# Patient Record
Sex: FEMALE | Race: OTHER | Employment: FULL TIME | URBAN - METROPOLITAN AREA
[De-identification: names, ages, dates, MRNs, and addresses within clinical notes are randomized per-mention and may not be internally consistent; named-entity substitution may affect disease eponyms.]

---

## 2019-01-28 ENCOUNTER — OFFICE VISIT (OUTPATIENT)
Dept: FAMILY MEDICINE CLINIC | Age: 24
End: 2019-01-28

## 2019-01-28 VITALS
TEMPERATURE: 98.6 F | BODY MASS INDEX: 26.66 KG/M2 | OXYGEN SATURATION: 99 % | DIASTOLIC BLOOD PRESSURE: 83 MMHG | SYSTOLIC BLOOD PRESSURE: 127 MMHG | WEIGHT: 127 LBS | HEART RATE: 92 BPM | RESPIRATION RATE: 16 BRPM | HEIGHT: 58 IN

## 2019-01-28 DIAGNOSIS — G56.03 BILATERAL CARPAL TUNNEL SYNDROME: ICD-10-CM

## 2019-01-28 DIAGNOSIS — R10.13 INTERMITTENT EPIGASTRIC ABDOMINAL PAIN: ICD-10-CM

## 2019-01-28 DIAGNOSIS — M62.838 CERVICAL PARASPINAL MUSCLE SPASM: Primary | ICD-10-CM

## 2019-01-28 RX ORDER — BACLOFEN 10 MG/1
10 TABLET ORAL
Qty: 30 TAB | Refills: 1 | Status: SHIPPED | OUTPATIENT
Start: 2019-01-28 | End: 2019-06-20

## 2019-01-28 RX ORDER — DICLOFENAC SODIUM 75 MG/1
75 TABLET, DELAYED RELEASE ORAL
Qty: 30 TAB | Refills: 1 | Status: SHIPPED | OUTPATIENT
Start: 2019-01-28 | End: 2020-08-25

## 2019-01-28 RX ORDER — OMEPRAZOLE 20 MG/1
20 CAPSULE, DELAYED RELEASE ORAL DAILY
Qty: 30 CAP | Refills: 0 | Status: SHIPPED | OUTPATIENT
Start: 2019-01-28 | End: 2019-03-23 | Stop reason: SDUPTHER

## 2019-01-28 NOTE — PROGRESS NOTES
Chief Complaint   Patient presents with    Abdominal Pain     x1 month    Back Pain     x1 month     she is a 24y.o. year old female who presents for evalution. Pt states has been having upper pain in back x months. Pt states has been having pain every day. Has not tried any OTCs or tried heat or ice. Pt works sitting down most of day, always at computer. No radiation into arms. Intermittent epigastric pains, only every once in awhile. Sometimes worse than others. Present for past 2 months. Eating twice daily. Usually if eats a lot before bed when wakes up in AM will have epigastric pain. No changes in bowel movements. Also has some tingling in hands that happens intermittently. Worse when on computer all day. Has not tried any OTCs. Reviewed PmHx, RxHx, FmHx, SocHx, AllgHx and updated and dated in the chart. Review of Systems - negative except as listed above in the HPI    Objective:     Vitals:    01/28/19 1353   BP: 127/83   Pulse: 92   Resp: 16   Temp: 98.6 °F (37 °C)   TempSrc: Oral   SpO2: 99%   Weight: 127 lb (57.6 kg)   Height: 4' 10\" (1.473 m)     Physical Examination: General appearance - alert, well appearing, and in no distress  Chest - clear to auscultation, no wheezes, rales or rhonchi, symmetric air entry  Heart - normal rate, regular rhythm, normal S1, S2, no murmurs, rubs, clicks or gallops  Abdomen - soft, nontender, nondistended, no masses or organomegaly  bowel sounds normal  Back exam - limited range of motion, pain with motion noted during exam, tenderness noted cervical spine, bilateral trap tightness     Assessment/ Plan:   Diagnoses and all orders for this visit:    1. Cervical paraspinal muscle spasm  -     baclofen (LIORESAL) 10 mg tablet; Take 1 Tab by mouth nightly as needed. For muscle spasms  New rx. Also printed out stretches. Application of heat or ice. F/U prn    2.  Intermittent epigastric abdominal pain  -     omeprazole (PRILOSEC) 20 mg capsule; Take 1 Cap by mouth daily. Take in AM on empty stomach, wait 30-60 min before eating  New rx. GERD diet, advance as tolerated, recommended adding probiotics to diet. 3. Bilateral carpal tunnel syndrome  -     diclofenac EC (VOLTAREN) 75 mg EC tablet; Take 1 Tab by mouth two (2) times daily (after meals). New rx for use after Prilosec. Stretches also given. FU prn     Pt voiced understanding regarding plan of care. Follow-up Disposition:  Return if symptoms worsen or fail to improve. I have discussed the diagnosis with the patient and the intended plan as seen in the above orders. The patient has received an after-visit summary and questions were answered concerning future plans.      Medication Side Effects and Warnings were discussed with patient    Dennis Montano NP

## 2019-01-28 NOTE — PROGRESS NOTES
1. Have you been to the ER, urgent care clinic since your last visit? Hospitalized since your last visit? No    2. Have you seen or consulted any other health care providers outside of the [address] [place] [address] since your last visit? Include any pap smears or colon screening.  No   Chief Complaint   Patient presents with    Abdominal Pain     x1 month    Back Pain     x1 month

## 2019-01-28 NOTE — PATIENT INSTRUCTIONS
For stomach - work on eating regularly throughout day time, usually no longer than 4-6 hours without food, start yogurt with probiotics  Take medication (omeprazole) for 2 weeks and then stop     For back - first try Baclofen at bedtime and exercises, is pain still present after 2 weeks on omeprazole may start taking Diclofenac - MUST TAKE WITH FOOD, can cause ulcer in stomach if taken on empty stomach      Carpal Tunnel Syndrome: Exercises  Your Care Instructions  Here are some examples of typical rehabilitation exercises for your condition. Start each exercise slowly. Ease off the exercise if you start to have pain. Your doctor or your physical or occupational therapist will tell you when you can start these exercises and which ones will work best for you. Warm-up stretches  When you no longer have pain or numbness, you can do exercises to help prevent carpal tunnel syndrome from coming back. Do not do any stretch or movement that is uncomfortable or painful. 1. Rotate your wrist up, down, and from side to side. Repeat 4 times. 2. Stretch your fingers far apart. Relax them, and then stretch them again. Repeat 4 times. 3. Stretch your thumb by pulling it back gently, holding it, and then releasing it. Repeat 4 times. How to do the exercises  Prayer stretch    1. Start with your palms together in front of your chest just below your chin. 2. Slowly lower your hands toward your waistline, keeping your hands close to your stomach and your palms together until you feel a mild to moderate stretch under your forearms. 3. Hold for at least 15 to 30 seconds. Repeat 2 to 4 times. Wrist flexor stretch    1. Extend your arm in front of you with your palm up. 2. Bend your wrist, pointing your hand toward the floor. 3. With your other hand, gently bend your wrist farther until you feel a mild to moderate stretch in your forearm. 4. Hold for at least 15 to 30 seconds. Repeat 2 to 4 times.     Wrist extensor stretch    1. Repeat steps 1 through 4 of the stretch above, but begin with your extended hand palm down. Follow-up care is a key part of your treatment and safety. Be sure to make and go to all appointments, and call your doctor if you are having problems. It's also a good idea to know your test results and keep a list of the medicines you take. Where can you learn more? Go to http://birdie-marisel.info/. Enter J347 in the search box to learn more about \"Carpal Tunnel Syndrome: Exercises. \"  Current as of: September 20, 2018  Content Version: 11.9  © 9829-7206 [employer]. Care instructions adapted under license by [employer] (which disclaims liability or warranty for this information). If you have questions about a medical condition or this instruction, always ask your healthcare professional. [address] any warranty or liability for your use of this information. Neck Spasm: Exercises  Your Care Instructions  Here are some examples of typical rehabilitation exercises for your condition. Start each exercise slowly. Ease off the exercise if you start to have pain. Your doctor or physical therapist will tell you when you can start these exercises and which ones will work best for you. How to do the exercises  Levator scapula stretch    1. Sit in a firm chair, or stand up straight. 2. Gently tilt your head toward your left shoulder. 3. Turn your head to look down into your armpit, bending your head slightly forward. Let the weight of your head stretch your neck muscles. 4. Hold for 15 to 30 seconds. 5. Return to your starting position. 6. Follow the same instructions above, but tilt your head toward your right shoulder. 7. Repeat 2 to 4 times toward each shoulder. Upper trapezius stretch    1. Sit in a firm chair, or stand up straight. 2. This stretch works best if you keep your shoulder down as you lean away from it.  To help you remember to do this, start by relaxing your shoulders and lightly holding on to your thighs or your chair. 3. Tilt your head toward your shoulder and hold for 15 to 30 seconds. Let the weight of your head stretch your muscles. 4. If you would like a little added stretch, place your arm behind your back. Use the arm opposite of the direction you are tilting your head. For example, if you are tilting your head to the left, place your right arm behind your back. 5. Repeat 2 to 4 times toward each shoulder. Neck rotation    1. Sit in a firm chair, or stand up straight. 2. Keeping your chin level, turn your head to the right, and hold for 15 to 30 seconds. 3. Turn your head to the left, and hold for 15 to 30 seconds. 4. Repeat 2 to 4 times to each side. Chin tuck    1. Lie on the floor with a rolled-up towel under your neck. Your head should be touching the floor. 2. Slowly bring your chin toward the front of your neck. 3. Hold for a count of 6, and then relax for up to 10 seconds. 4. Repeat 8 to 12 times. Forward neck flexion    1. Sit in a firm chair, or stand up straight. 2. Bend your head forward. 3. Hold for 15 to 30 seconds, then return to your starting position. 4. Repeat 2 to 4 times. Follow-up care is a key part of your treatment and safety. Be sure to make and go to all appointments, and call your doctor if you are having problems. It's also a good idea to know your test results and keep a list of the medicines you take. Where can you learn more? Go to http://birdie-marisel.info/. Enter P962 in the search box to learn more about \"Neck Spasm: Exercises. \"  Current as of: September 20, 2018  Content Version: 11.9  © 4503-3398 [employer]. Care instructions adapted under license by [employer] (which disclaims liability or warranty for this information).  If you have questions about a medical condition or this instruction, always ask your healthcare professional. [address] any warranty or liability for your use of this information.

## 2019-03-23 DIAGNOSIS — R10.13 INTERMITTENT EPIGASTRIC ABDOMINAL PAIN: ICD-10-CM

## 2019-03-26 RX ORDER — OMEPRAZOLE 20 MG/1
CAPSULE, DELAYED RELEASE ORAL
Qty: 30 CAP | Refills: 0 | Status: SHIPPED | OUTPATIENT
Start: 2019-03-26 | End: 2019-06-02 | Stop reason: SDUPTHER

## 2019-06-20 ENCOUNTER — OFFICE VISIT (OUTPATIENT)
Dept: FAMILY MEDICINE CLINIC | Age: 24
End: 2019-06-20

## 2019-06-20 VITALS
DIASTOLIC BLOOD PRESSURE: 63 MMHG | HEART RATE: 76 BPM | OXYGEN SATURATION: 99 % | WEIGHT: 125.3 LBS | TEMPERATURE: 98.7 F | HEIGHT: 58 IN | BODY MASS INDEX: 26.3 KG/M2 | SYSTOLIC BLOOD PRESSURE: 100 MMHG | RESPIRATION RATE: 15 BRPM

## 2019-06-20 DIAGNOSIS — R53.83 FATIGUE, UNSPECIFIED TYPE: Primary | ICD-10-CM

## 2019-06-20 DIAGNOSIS — G56.03 BILATERAL CARPAL TUNNEL SYNDROME: ICD-10-CM

## 2019-06-20 NOTE — PROGRESS NOTES
Chief Complaint   Patient presents with    Fatigue     X 2 weeks Feels cold: not taking Iron    Tingling     Hands and feet- Numbness    Nausea     GERD/abdo pain    Abdominal Pain     Back pain     1. Have you been to the ER, urgent care clinic since your last visit? Hospitalized since your last visit? No    2. Have you seen or consulted any other health care providers outside of the [address] [place] [address] since your last visit? Include any pap smears or colon screening. No      Chief Complaint   Patient presents with    Fatigue     X 2 weeks Feels cold: not taking Iron    Tingling     Hands and feet- Numbness    Nausea     GERD/abdo pain    Abdominal Pain     Back pain     She is a 21 y.o. female who presents for evalution. Reviewed PmHx, RxHx, FmHx, SocHx, AllgHx and updated and dated in the chart. Patient Active Problem List    Diagnosis    Pregnancy       Review of Systems - negative except as listed above in the HPI    Objective:     Vitals:    06/20/19 1548   BP: 100/63   Pulse: 76   Resp: 15   Temp: 98.7 °F (37.1 °C)   TempSrc: Oral   SpO2: 99%   Weight: 125 lb 4.8 oz (56.8 kg)   Height: 4' 10\" (1.473 m)     Physical Examination: General appearance - alert, well appearing, and in no distress  Chest - clear to auscultation, no wheezes, rales or rhonchi, symmetric air entry  Heart - normal rate, regular rhythm, normal S1, S2, no murmurs, rubs, clicks or gallops  Abdomen - soft, nontender, nondistended, no masses or organomegaly  Musculoskeletal - no joint tenderness, deformity or swelling    Assessment/ Plan:   Diagnoses and all orders for this visit:    1. Fatigue, unspecified type  -     METABOLIC PANEL, COMPREHENSIVE  -     CBC WITH AUTOMATED DIFF  -     TSH 3RD GENERATION  -     LIANET BARR VIRUS AB PANEL  -no clear source by hx    2.  Bilateral carpal tunnel syndrome  -rec CTS braces nightlyh           I have discussed the diagnosis with the patient and the intended plan as seen in the above orders. The patient understands and agrees with the plan. The patient has received an after-visit summary and questions were answered concerning future plans. Medication Side Effects and Warnings were discussed with patient  Patient Labs were reviewed and or requested:  Patient Past Records were reviewed and or requested    Debra Rae M.D. Patient Instructions          Carpal Tunnel Syndrome: Care Instructions  Your Care Instructions    Carpal tunnel syndrome is a nerve problem. It can cause tingling, numbness, weakness, or pain in the fingers, thumb, and hand. The median nerve and several tough tissues called tendons run through a space in the wrist called the carpal tunnel. The repeated hand motions used in work and some hobbies and sports can put pressure on the nerve. Pregnancy and several conditions, including diabetes, arthritis, and an underactive thyroid, also can cause carpal tunnel syndrome. You may be able to limit an activity or do it differently to reduce your symptoms. You also can take other steps to feel better. If your symptoms are mild, 1 to 2 weeks of home treatment are likely to ease your pain. Surgery is needed only if other treatments do not work. Follow-up care is a key part of your treatment and safety. Be sure to make and go to all appointments, and call your doctor if you are having problems. It's also a good idea to know your test results and keep a list of the medicines you take. How can you care for yourself at home? · If possible, stop or reduce the activity that causes your symptoms. If you cannot stop the activity, take frequent breaks to rest and stretch or change hand positions to do a task. Try switching hands, such as when using a computer mouse. · Try to avoid bending or twisting your wrists. · Ask your doctor if you can take an over-the-counter pain medicine, such as acetaminophen (Tylenol), ibuprofen (Advil, Motrin), or naproxen (Aleve).  Be safe with medicines. Read and follow all instructions on the label. · If your doctor prescribes corticosteroid medicine to help reduce pain and swelling, take it exactly as prescribed. Call your doctor if you think you are having a problem with your medicine. · Put ice or a cold pack on your wrist for 10 to 20 minutes at a time to ease pain. Put a thin cloth between the ice and your skin. · If your doctor or your physical or occupational therapist tells you to wear a wrist splint, wear it as directed to keep your wrist in a neutral position. This also eases pressure on your median nerve. · Ask your doctor whether you should have physical or occupational therapy to learn how to do tasks differently. · Try a yoga class to stretch your muscles and build strength in your hands and wrists. Yoga has been shown to ease carpal tunnel symptoms. To prevent carpal tunnel  · When working at a computer, keep your hands and wrists in line with your forearms. Hold your elbows close to your sides. Take a break every 10 to 15 minutes. · Try these exercises:  ? Warm up: Rotate your wrist up, down, and from side to side. Repeat this 4 times. Stretch your fingers far apart, relax them, then stretch them again. Repeat 4 times. Stretch your thumb by pulling it back gently, holding it, and then releasing it. Repeat 4 times. ? Prayer stretch: Start with your palms together in front of your chest just below your chin. Slowly lower your hands toward your waistline while keeping your hands close to your stomach and your palms together until you feel a mild to moderate stretch under your forearms. Hold for 10 to 20 seconds. Repeat 4 times. ? Wrist flexor stretch: Hold your arm in front of you with your palm up. Bend your wrist, pointing your hand toward the floor. With your other hand, gently bend your wrist further until you feel a mild to moderate stretch in your forearm. Hold for 10 to 20 seconds. Repeat 4 times.   ? Wrist extensor stretch: Repeat the steps for the wrist flexor stretch, but begin with your extended hand palm down. · Squeeze a rubber exercise ball several times a day to keep your hands and fingers strong. · Avoid holding objects (such as a book) in one position for a long time. When possible, use your whole hand to grasp an object. Using just the thumb and index finger can put stress on the wrist.  · Do not smoke. It can make this condition worse by reducing blood flow to the median nerve. If you need help quitting, talk to your doctor about stop-smoking programs and medicines. These can increase your chances of quitting for good. When should you call for help? Watch closely for changes in your health, and be sure to contact your doctor if:    · Your pain or other problems do not get better with home care.     · You want more information about physical or occupational therapy.     · You have side effects of your corticosteroid medicine, such as:  ? Weight gain. ? Mood changes. ? Trouble sleeping. ? Bruising easily.     · You have any other problems with your medicine. Where can you learn more? Go to http://birdie-marisel.info/. Enter R432 in the search box to learn more about \"Carpal Tunnel Syndrome: Care Instructions. \"  Current as of: September 20, 2018  Content Version: 11.9  © 2253-4640 [employer]. Care instructions adapted under license by [employer] (which disclaims liability or warranty for this information). If you have questions about a medical condition or this instruction, always ask your healthcare professional. [place] [address] any warranty or liability for your use of this information.

## 2019-06-20 NOTE — PATIENT INSTRUCTIONS
Carpal Tunnel Syndrome: Care Instructions  Your Care Instructions    Carpal tunnel syndrome is a nerve problem. It can cause tingling, numbness, weakness, or pain in the fingers, thumb, and hand. The median nerve and several tough tissues called tendons run through a space in the wrist called the carpal tunnel. The repeated hand motions used in work and some hobbies and sports can put pressure on the nerve. Pregnancy and several conditions, including diabetes, arthritis, and an underactive thyroid, also can cause carpal tunnel syndrome. You may be able to limit an activity or do it differently to reduce your symptoms. You also can take other steps to feel better. If your symptoms are mild, 1 to 2 weeks of home treatment are likely to ease your pain. Surgery is needed only if other treatments do not work. Follow-up care is a key part of your treatment and safety. Be sure to make and go to all appointments, and call your doctor if you are having problems. It's also a good idea to know your test results and keep a list of the medicines you take. How can you care for yourself at home? · If possible, stop or reduce the activity that causes your symptoms. If you cannot stop the activity, take frequent breaks to rest and stretch or change hand positions to do a task. Try switching hands, such as when using a computer mouse. · Try to avoid bending or twisting your wrists. · Ask your doctor if you can take an over-the-counter pain medicine, such as acetaminophen (Tylenol), ibuprofen (Advil, Motrin), or naproxen (Aleve). Be safe with medicines. Read and follow all instructions on the label. · If your doctor prescribes corticosteroid medicine to help reduce pain and swelling, take it exactly as prescribed. Call your doctor if you think you are having a problem with your medicine. · Put ice or a cold pack on your wrist for 10 to 20 minutes at a time to ease pain.  Put a thin cloth between the ice and your skin.  · If your doctor or your physical or occupational therapist tells you to wear a wrist splint, wear it as directed to keep your wrist in a neutral position. This also eases pressure on your median nerve. · Ask your doctor whether you should have physical or occupational therapy to learn how to do tasks differently. · Try a yoga class to stretch your muscles and build strength in your hands and wrists. Yoga has been shown to ease carpal tunnel symptoms. To prevent carpal tunnel  · When working at a computer, keep your hands and wrists in line with your forearms. Hold your elbows close to your sides. Take a break every 10 to 15 minutes. · Try these exercises:  ? Warm up: Rotate your wrist up, down, and from side to side. Repeat this 4 times. Stretch your fingers far apart, relax them, then stretch them again. Repeat 4 times. Stretch your thumb by pulling it back gently, holding it, and then releasing it. Repeat 4 times. ? Prayer stretch: Start with your palms together in front of your chest just below your chin. Slowly lower your hands toward your waistline while keeping your hands close to your stomach and your palms together until you feel a mild to moderate stretch under your forearms. Hold for 10 to 20 seconds. Repeat 4 times. ? Wrist flexor stretch: Hold your arm in front of you with your palm up. Bend your wrist, pointing your hand toward the floor. With your other hand, gently bend your wrist further until you feel a mild to moderate stretch in your forearm. Hold for 10 to 20 seconds. Repeat 4 times. ? Wrist extensor stretch: Repeat the steps for the wrist flexor stretch, but begin with your extended hand palm down. · Squeeze a rubber exercise ball several times a day to keep your hands and fingers strong. · Avoid holding objects (such as a book) in one position for a long time. When possible, use your whole hand to grasp an object.  Using just the thumb and index finger can put stress on the wrist.  · Do not smoke. It can make this condition worse by reducing blood flow to the median nerve. If you need help quitting, talk to your doctor about stop-smoking programs and medicines. These can increase your chances of quitting for good. When should you call for help? Watch closely for changes in your health, and be sure to contact your doctor if:    · Your pain or other problems do not get better with home care.     · You want more information about physical or occupational therapy.     · You have side effects of your corticosteroid medicine, such as:  ? Weight gain. ? Mood changes. ? Trouble sleeping. ? Bruising easily.     · You have any other problems with your medicine. Where can you learn more? Go to http://birdie-marisel.info/. Enter R432 in the search box to learn more about \"Carpal Tunnel Syndrome: Care Instructions. \"  Current as of: September 20, 2018  Content Version: 11.9  © 6668-4121 [employer]. Care instructions adapted under license by [employer] (which disclaims liability or warranty for this information). If you have questions about a medical condition or this instruction, always ask your healthcare professional. [place] [address] any warranty or liability for your use of this information.

## 2019-06-22 LAB
ALBUMIN SERPL-MCNC: 5.1 G/DL (ref 3.5–5.5)
ALBUMIN/GLOB SERPL: 1.8 {RATIO} (ref 1.2–2.2)
ALP SERPL-CCNC: 91 IU/L (ref 39–117)
ALT SERPL-CCNC: 13 IU/L (ref 0–32)
AST SERPL-CCNC: 21 IU/L (ref 0–40)
BASOPHILS # BLD AUTO: 0 X10E3/UL (ref 0–0.2)
BASOPHILS NFR BLD AUTO: 0 %
BILIRUB SERPL-MCNC: 1 MG/DL (ref 0–1.2)
BUN SERPL-MCNC: 7 MG/DL (ref 6–20)
BUN/CREAT SERPL: 9 (ref 9–23)
CALCIUM SERPL-MCNC: 9.6 MG/DL (ref 8.7–10.2)
CHLORIDE SERPL-SCNC: 104 MMOL/L (ref 96–106)
CO2 SERPL-SCNC: 23 MMOL/L (ref 20–29)
CREAT SERPL-MCNC: 0.81 MG/DL (ref 0.57–1)
EBV EA IGG SER-ACNC: <9 U/ML (ref 0–8.9)
EBV NA IGG SER IA-ACNC: 166 U/ML (ref 0–17.9)
EBV VCA IGG SER IA-ACNC: 513 U/ML (ref 0–17.9)
EBV VCA IGM SER IA-ACNC: <36 U/ML (ref 0–35.9)
EOSINOPHIL # BLD AUTO: 0.1 X10E3/UL (ref 0–0.4)
EOSINOPHIL NFR BLD AUTO: 1 %
ERYTHROCYTE [DISTWIDTH] IN BLOOD BY AUTOMATED COUNT: 15.3 % (ref 12.3–15.4)
GLOBULIN SER CALC-MCNC: 2.8 G/DL (ref 1.5–4.5)
GLUCOSE SERPL-MCNC: 80 MG/DL (ref 65–99)
HCT VFR BLD AUTO: 39 % (ref 34–46.6)
HGB BLD-MCNC: 12.9 G/DL (ref 11.1–15.9)
IMM GRANULOCYTES # BLD AUTO: 0 X10E3/UL (ref 0–0.1)
IMM GRANULOCYTES NFR BLD AUTO: 0 %
LYMPHOCYTES # BLD AUTO: 1.7 X10E3/UL (ref 0.7–3.1)
LYMPHOCYTES NFR BLD AUTO: 27 %
MCH RBC QN AUTO: 30 PG (ref 26.6–33)
MCHC RBC AUTO-ENTMCNC: 33.1 G/DL (ref 31.5–35.7)
MCV RBC AUTO: 91 FL (ref 79–97)
MONOCYTES # BLD AUTO: 0.4 X10E3/UL (ref 0.1–0.9)
MONOCYTES NFR BLD AUTO: 6 %
NEUTROPHILS # BLD AUTO: 4.2 X10E3/UL (ref 1.4–7)
NEUTROPHILS NFR BLD AUTO: 66 %
PLATELET # BLD AUTO: 280 X10E3/UL (ref 150–450)
POTASSIUM SERPL-SCNC: 3.9 MMOL/L (ref 3.5–5.2)
PROT SERPL-MCNC: 7.9 G/DL (ref 6–8.5)
RBC # BLD AUTO: 4.3 X10E6/UL (ref 3.77–5.28)
SERVICE CMNT-IMP: ABNORMAL
SODIUM SERPL-SCNC: 139 MMOL/L (ref 134–144)
TSH SERPL DL<=0.005 MIU/L-ACNC: 119.9 UIU/ML (ref 0.45–4.5)
WBC # BLD AUTO: 6.4 X10E3/UL (ref 3.4–10.8)

## 2019-06-24 DIAGNOSIS — E03.9 ACQUIRED HYPOTHYROIDISM: Primary | ICD-10-CM

## 2019-06-24 RX ORDER — LEVOTHYROXINE SODIUM 150 UG/1
150 TABLET ORAL
Qty: 30 TAB | Refills: 1 | Status: SHIPPED | OUTPATIENT
Start: 2019-06-24 | End: 2019-07-22 | Stop reason: SDUPTHER

## 2019-06-24 NOTE — PROGRESS NOTES
1. Thyroid is off and needs rx, rx called in  2.  The EBV panel shows possible recovering from an Mono like virus and there is not trt and only time will heal.    Emmy Flores

## 2019-06-25 NOTE — PROGRESS NOTES
Patient called and verified. Reviewed lab results with patient. Already started thyroid medication this am. 6 week follow up scheduled.

## 2019-08-19 ENCOUNTER — OFFICE VISIT (OUTPATIENT)
Dept: FAMILY MEDICINE CLINIC | Age: 24
End: 2019-08-19

## 2019-08-19 VITALS
DIASTOLIC BLOOD PRESSURE: 71 MMHG | OXYGEN SATURATION: 98 % | WEIGHT: 123 LBS | BODY MASS INDEX: 25.82 KG/M2 | HEIGHT: 58 IN | RESPIRATION RATE: 18 BRPM | HEART RATE: 83 BPM | TEMPERATURE: 98.6 F | SYSTOLIC BLOOD PRESSURE: 108 MMHG

## 2019-08-19 DIAGNOSIS — E78.00 HIGH CHOLESTEROL: ICD-10-CM

## 2019-08-19 DIAGNOSIS — E03.9 ACQUIRED HYPOTHYROIDISM: Primary | ICD-10-CM

## 2019-08-19 NOTE — PROGRESS NOTES
Patient here for fasting labs. She would like cholesterol labs drawn and thyroid levels drawn. Patient started thyroid meds last visit. 1. Have you been to the ER, urgent care clinic since your last visit? Hospitalized since your last visit? No    2. Have you seen or consulted any other health care providers outside of the [address] [place] [address] since your last visit? Include any pap smears or colon screening. No       Chief Complaint   Patient presents with    Labs     fasting labs     She is a 21 y.o. female who presents for evalution. Reviewed PmHx, RxHx, FmHx, SocHx, AllgHx and updated and dated in the chart. Patient Active Problem List    Diagnosis    Pregnancy       Review of Systems - negative except as listed above in the HPI    Objective:     Vitals:    08/19/19 1011   BP: 108/71   Pulse: 83   Resp: 18   Temp: 98.6 °F (37 °C)   SpO2: 98%   Weight: 123 lb (55.8 kg)   Height: 4' 10\" (1.473 m)     Physical Examination: General appearance - alert, well appearing, and in no distress  Neck - supple, no significant adenopathy  Chest - clear to auscultation, no wheezes, rales or rhonchi, symmetric air entry  Heart - normal rate, regular rhythm, normal S1, S2, no murmurs, rubs, clicks or gallops      Assessment/ Plan:   Diagnoses and all orders for this visit:    1. Acquired hypothyroidism  -     TSH 3RD GENERATION  -feels better with rx add on    2. High cholesterol  -     LIPID PANEL  -     METABOLIC PANEL, COMPREHENSIVE       Follow-up and Dispositions    · Return in about 3 months (around 11/19/2019). I have discussed the diagnosis with the patient and the intended plan as seen in the above orders. The patient understands and agrees with the plan. The patient has received an after-visit summary and questions were answered concerning future plans.      Medication Side Effects and Warnings were discussed with patient  Patient Labs were reviewed and or requested:  Patient Past Records were reviewed and or requested    Rosalva Colindres M.D. There are no Patient Instructions on file for this visit.

## 2019-08-20 DIAGNOSIS — E03.9 ACQUIRED HYPOTHYROIDISM: ICD-10-CM

## 2019-08-20 LAB
ALBUMIN SERPL-MCNC: 4.5 G/DL (ref 3.5–5.5)
ALBUMIN/GLOB SERPL: 1.7 {RATIO} (ref 1.2–2.2)
ALP SERPL-CCNC: 93 IU/L (ref 39–117)
ALT SERPL-CCNC: 37 IU/L (ref 0–32)
AST SERPL-CCNC: 30 IU/L (ref 0–40)
BILIRUB SERPL-MCNC: 1.1 MG/DL (ref 0–1.2)
BUN SERPL-MCNC: 9 MG/DL (ref 6–20)
BUN/CREAT SERPL: 14 (ref 9–23)
CALCIUM SERPL-MCNC: 9.5 MG/DL (ref 8.7–10.2)
CHLORIDE SERPL-SCNC: 104 MMOL/L (ref 96–106)
CHOLEST SERPL-MCNC: 151 MG/DL (ref 100–199)
CO2 SERPL-SCNC: 22 MMOL/L (ref 20–29)
CREAT SERPL-MCNC: 0.65 MG/DL (ref 0.57–1)
GLOBULIN SER CALC-MCNC: 2.7 G/DL (ref 1.5–4.5)
GLUCOSE SERPL-MCNC: 89 MG/DL (ref 65–99)
HDLC SERPL-MCNC: 73 MG/DL
INTERPRETATION, 910389: NORMAL
LDLC SERPL CALC-MCNC: 67 MG/DL (ref 0–99)
POTASSIUM SERPL-SCNC: 4.4 MMOL/L (ref 3.5–5.2)
PROT SERPL-MCNC: 7.2 G/DL (ref 6–8.5)
SODIUM SERPL-SCNC: 142 MMOL/L (ref 134–144)
TRIGL SERPL-MCNC: 53 MG/DL (ref 0–149)
TSH SERPL DL<=0.005 MIU/L-ACNC: 0.03 UIU/ML (ref 0.45–4.5)
VLDLC SERPL CALC-MCNC: 11 MG/DL (ref 5–40)

## 2019-08-20 RX ORDER — LEVOTHYROXINE SODIUM 137 UG/1
150 TABLET ORAL
Qty: 90 TAB | Refills: 1 | Status: SHIPPED | OUTPATIENT
Start: 2019-08-20 | End: 2019-09-16

## 2019-09-12 ENCOUNTER — OFFICE VISIT (OUTPATIENT)
Dept: FAMILY MEDICINE CLINIC | Age: 24
End: 2019-09-12

## 2019-09-12 VITALS
OXYGEN SATURATION: 98 % | TEMPERATURE: 98.9 F | BODY MASS INDEX: 25.82 KG/M2 | HEIGHT: 58 IN | SYSTOLIC BLOOD PRESSURE: 109 MMHG | WEIGHT: 123 LBS | DIASTOLIC BLOOD PRESSURE: 72 MMHG | HEART RATE: 111 BPM | RESPIRATION RATE: 18 BRPM

## 2019-09-12 DIAGNOSIS — R55 NEAR SYNCOPE: Primary | ICD-10-CM

## 2019-09-12 DIAGNOSIS — E03.9 ACQUIRED HYPOTHYROIDISM: ICD-10-CM

## 2019-09-12 NOTE — PROGRESS NOTES
Patient here for dizziness x 1 week. This has happened while working out and at work. Patient states she feels like she is going to fall down, nauseated, and sometimes confused. She also c/o facial pain, right ear to right jaw. This is intermittent pain. No pain now. This is a random pain, not related to syncopal episodes. 1. Have you been to the ER, urgent care clinic since your last visit? Hospitalized since your last visit? No    2. Have you seen or consulted any other health care providers outside of the [employer] [facility] since your last visit? Include any pap smears or colon screening. No       Just started working out, no cp    On new thyroid rx    Chief Complaint   Patient presents with    Dizziness     last week, during job and working out   Heloise Lolita Facial Pain     right ear area down to right neck     She is a 21 y.o. female who presents for evalution. Reviewed PmHx, RxHx, FmHx, SocHx, AllgHx and updated and dated in the chart. Patient Active Problem List    Diagnosis    Pregnancy       Review of Systems - negative except as listed above in the HPI    Objective:     Vitals:    09/12/19 1508   BP: 109/72   Pulse: (!) 111   Resp: 18   Temp: 98.9 °F (37.2 °C)   SpO2: 98%   Weight: 123 lb (55.8 kg)   Height: 4' 10\" (1.473 m)     Physical Examination: General appearance - alert, well appearing, and in no distress  Mouth - mucous membranes moist, pharynx normal without lesions  Neck - supple, no significant adenopathy  Chest - clear to auscultation, no wheezes, rales or rhonchi, symmetric air entry  Heart - normal rate, regular rhythm, normal S1, S2, no murmurs, rubs, clicks or gallops  Neurological - alert, oriented, normal speech, no focal findings or movement disorder noted  Extremities - peripheral pulses normal, no pedal edema, no clubbing or cyanosis    Assessment/ Plan:   Diagnoses and all orders for this visit:    1. Near syncope  -due to overdoing the work out   -start slower    2. Acquired hypothyroidism  -     TSH 3RD GENERATION       Follow-up and Dispositions    · Return if symptoms worsen or fail to improve. I have discussed the diagnosis with the patient and the intended plan as seen in the above orders. The patient understands and agrees with the plan. The patient has received an after-visit summary and questions were answered concerning future plans. Medication Side Effects and Warnings were discussed with patient  Patient Labs were reviewed and or requested:  Patient Past Records were reviewed and or requested    Kelly Rushing M.D. There are no Patient Instructions on file for this visit.

## 2019-09-14 LAB — TSH SERPL DL<=0.005 MIU/L-ACNC: 0.01 UIU/ML (ref 0.45–4.5)

## 2019-09-16 DIAGNOSIS — E03.9 ACQUIRED HYPOTHYROIDISM: ICD-10-CM

## 2019-09-16 RX ORDER — LEVOTHYROXINE SODIUM 125 UG/1
125 TABLET ORAL
Qty: 90 TAB | Refills: 1 | Status: SHIPPED | OUTPATIENT
Start: 2019-09-16 | End: 2020-03-12

## 2019-12-16 ENCOUNTER — OFFICE VISIT (OUTPATIENT)
Dept: FAMILY MEDICINE CLINIC | Age: 24
End: 2019-12-16

## 2019-12-16 VITALS
BODY MASS INDEX: 24.1 KG/M2 | SYSTOLIC BLOOD PRESSURE: 103 MMHG | TEMPERATURE: 98.7 F | DIASTOLIC BLOOD PRESSURE: 68 MMHG | HEIGHT: 58 IN | WEIGHT: 114.8 LBS | HEART RATE: 68 BPM | RESPIRATION RATE: 16 BRPM | OXYGEN SATURATION: 99 %

## 2019-12-16 DIAGNOSIS — E03.9 ACQUIRED HYPOTHYROIDISM: Primary | ICD-10-CM

## 2019-12-16 NOTE — PROGRESS NOTES
Chief Complaint   Patient presents with    Thyroid Problem    Labs    GERD     Taking Omeprazole as needed     1. Have you been to the ER, urgent care clinic since your last visit? Hospitalized since your last visit? No    2. Have you seen or consulted any other health care providers outside of the [address] [place] [address] since your last visit? Include any pap smears or colon screening. No      Chief Complaint   Patient presents with    Thyroid Problem    Labs    GERD     Taking Omeprazole as needed     She is a 21 y.o. female who presents for evalution. Reviewed PmHx, RxHx, FmHx, SocHx, AllgHx and updated and dated in the chart. Patient Active Problem List    Diagnosis    Pregnancy       Review of Systems - negative except as listed above in the HPI    Objective:     Vitals:    12/16/19 1555   BP: 103/68   Pulse: 68   Resp: 16   Temp: 98.7 °F (37.1 °C)   TempSrc: Oral   SpO2: 99%   Weight: 114 lb 12.8 oz (52.1 kg)   Height: 4' 10\" (1.473 m)     Physical Examination: General appearance - alert, well appearing, and in no distress      Assessment/ Plan:   Diagnoses and all orders for this visit:    1. Acquired hypothyroidism  -     TSH 3RD GENERATION; Future  -levi rx changes       Follow-up and Dispositions    · Return in about 6 months (around 6/16/2020). I have discussed the diagnosis with the patient and the intended plan as seen in the above orders. The patient understands and agrees with the plan. The patient has received an after-visit summary and questions were answered concerning future plans. Medication Side Effects and Warnings were discussed with patient  Patient Labs were reviewed and or requested:  Patient Past Records were reviewed and or requested    Amarilys Rome M.D. There are no Patient Instructions on file for this visit.

## 2019-12-18 ENCOUNTER — OFFICE VISIT (OUTPATIENT)
Dept: OBGYN CLINIC | Age: 24
End: 2019-12-18

## 2019-12-18 ENCOUNTER — HOSPITAL ENCOUNTER (OUTPATIENT)
Dept: LAB | Age: 24
Discharge: HOME | End: 2019-12-18

## 2019-12-18 VITALS — SYSTOLIC BLOOD PRESSURE: 104 MMHG | DIASTOLIC BLOOD PRESSURE: 60 MMHG | WEIGHT: 115 LBS | BODY MASS INDEX: 24.04 KG/M2

## 2019-12-18 DIAGNOSIS — N93.0 PCB (POST COITAL BLEEDING): ICD-10-CM

## 2019-12-18 DIAGNOSIS — Z01.419 ENCOUNTER FOR WELL WOMAN EXAM WITH ROUTINE GYNECOLOGICAL EXAM: Primary | ICD-10-CM

## 2019-12-18 DIAGNOSIS — Z01.419 WELL WOMAN EXAM WITH ROUTINE GYNECOLOGICAL EXAM: ICD-10-CM

## 2019-12-18 DIAGNOSIS — E03.9 ACQUIRED HYPOTHYROIDISM: ICD-10-CM

## 2019-12-18 LAB — TSH SERPL DL<=0.05 MIU/L-ACNC: 0.11 UIU/ML (ref 0.36–3.74)

## 2019-12-18 NOTE — PROGRESS NOTES
Annual exam ages 21-44    Vonda Lambert is a ,  21 y.o. female   No LMP recorded. (Menstrual status: Medically Induced). She presents for her annual checkup. She is having bleeding after intercourse since she got a Nexplanon placed last year. With regard to the Gardasil vaccine, she declines to receive it. Menstrual status:    Her periods are normal in flow. She is using three to ten pads or tampons per day, usually regular with a 26-32 day interval with 3-7 day duration. She has post coital bleeding. She does not have dysmenorrhea. She reports no premenstrual symptoms. Contraception:    The current method of family planning is nexplanon. Sexual history:    She  reports never being sexually active. Medical conditions:    Since her last annual GYN exam about one year ago, she has not the following changes in her health history: none. Surgical history confirmed with patient. has no past surgical history on file. Pap and Mammogram History:    Her most recent Pap smear was normal per pt, obtained unknown year(s) ago. The patient has never had a mammogram.    Breast Cancer History/Substance Abuse: negative    Past Medical History:   Diagnosis Date    Gonorrhea     hx of      No past surgical history on file. Current Outpatient Medications   Medication Sig Dispense Refill    levothyroxine (SYNTHROID) 125 mcg tablet Take 1 Tab by mouth Daily (before breakfast). 90 Tab 1    omeprazole (PRILOSEC) 20 mg capsule TAKE 1 CAPSULE BY MOUTH DAILY. TAKE IN AM ON EMPTY STOMACH, WAIT 30-60 MINUTES BEFORE EATING 90 Cap 1    diclofenac EC (VOLTAREN) 75 mg EC tablet Take 1 Tab by mouth two (2) times daily (after meals). 30 Tab 1    ibuprofen (MOTRIN) 800 mg tablet Take 1 Tab by mouth every six (6) hours as needed for Pain. 30 Tab 1     Allergies: Patient has no known allergies. Tobacco History:  reports that she has never smoked.  She has never used smokeless tobacco.  Alcohol Abuse:  reports no history of alcohol use. Drug Abuse:  reports no history of drug use. Family Medical/Cancer History: No family history on file.      Review of Systems - History obtained from the patient  Constitutional: negative for weight loss, fever, night sweats  HEENT: negative for hearing loss, earache, congestion, snoring, sorethroat  CV: negative for chest pain, palpitations, edema  Resp: negative for cough, shortness of breath, wheezing  GI: negative for change in bowel habits, abdominal pain, black or bloody stools  : negative for frequency, dysuria, hematuria, vaginal discharge  MSK: negative for back pain, joint pain, muscle pain  Breast: negative for breast lumps, nipple discharge, galactorrhea  Skin :negative for itching, rash, hives  Neuro: negative for dizziness, headache, confusion, weakness  Psych: negative for anxiety, depression, change in mood  Heme/lymph: negative for bleeding, bruising, pallor    Physical Exam    Visit Vitals  /60   Wt 115 lb (52.2 kg)   BMI 24.04 kg/m²       Constitutional  · Appearance: well-nourished, well developed, alert, in no acute distress    HENT  · Head and Face: appears normal    Neck  · Inspection/Palpation: normal appearance, no masses or tenderness  · Lymph Nodes: no lymphadenopathy present  · Thyroid: gland size normal, nontender, no nodules or masses present on palpation    Chest  · Respiratory Effort: breathing unlabored  · Auscultation:     Cardiovascular  · Heart:  · Auscultation:     Breasts  · Inspection of Breasts: breasts symmetrical, no skin changes, no discharge present, nipple appearance normal, no skin retraction present  · Palpation of Breasts and Axillae: no masses present on palpation, no breast tenderness  · Axillary Lymph Nodes: no lymphadenopathy present    Gastrointestinal  · Abdominal Examination: abdomen non-tender to palpation, normal bowel sounds, no masses present  · Liver and spleen: no hepatomegaly present, spleen not palpable  · Hernias: no hernias identified    Genitourinary  · External Genitalia: normal appearance for age, no discharge present, no tenderness present, no inflammatory lesions present, no masses present, no atrophy present  · Vagina: normal vaginal vault without central or paravaginal defects, no discharge present, no inflammatory lesions present, no masses present  · Bladder: non-tender to palpation  · Urethra: appears normal  · Cervix: normal   · Uterus: normal size, shape and consistency  · Adnexa: no adnexal tenderness present, no adnexal masses present  · Perineum: perineum within normal limits, no evidence of trauma, no rashes or skin lesions present  · Anus: anus within normal limits, no hemorrhoids present  · Inguinal Lymph Nodes: no lymphadenopathy present    Skin  · General Inspection: no rash, no lesions identified    Neurologic/Psychiatric  · Mental Status:  · Orientation: grossly oriented to person, place and time  · Mood and Affect: mood normal, affect appropriate    . Assessment:  Routine gynecologic examination  Her current medical status is satisfactory with post coital bleeding with Nexplanon. Plan:  Counseled re: diet, exercise, healthy lifestyle  Return for yearly wellness visits  Pap and cervical cultures sent due to post coital bleeding. She wants to have the Nexplanon removed and go on a low dose OCP.   She will start Lo Loestrin and come in in 3-4 weeks for Nexplanon removal.

## 2019-12-21 LAB
A VAGINAE DNA VAG QL NAA+PROBE: NORMAL SCORE
BVAB2 DNA VAG QL NAA+PROBE: NORMAL SCORE
C ALBICANS DNA VAG QL NAA+PROBE: NEGATIVE
C GLABRATA DNA VAG QL NAA+PROBE: NEGATIVE
C TRACH RRNA SPEC QL NAA+PROBE: NEGATIVE
MEGA1 DNA VAG QL NAA+PROBE: NORMAL SCORE
N GONORRHOEA RRNA SPEC QL NAA+PROBE: NEGATIVE
T VAGINALIS RRNA SPEC QL NAA+PROBE: NEGATIVE

## 2019-12-23 LAB
CYTOLOGIST CVX/VAG CYTO: NORMAL
CYTOLOGY CVX/VAG DOC CYTO: NORMAL
CYTOLOGY CVX/VAG DOC THIN PREP: NORMAL
CYTOLOGY HISTORY:: NORMAL
DX ICD CODE: NORMAL
LABCORP, 190119: NORMAL
Lab: NORMAL
OTHER STN SPEC: NORMAL
STAT OF ADQ CVX/VAG CYTO-IMP: NORMAL

## 2020-03-12 DIAGNOSIS — E03.9 ACQUIRED HYPOTHYROIDISM: ICD-10-CM

## 2020-03-12 RX ORDER — LEVOTHYROXINE SODIUM 125 UG/1
TABLET ORAL
Qty: 90 TAB | Refills: 1 | Status: SHIPPED | OUTPATIENT
Start: 2020-03-12 | End: 2020-06-17 | Stop reason: SDUPTHER

## 2020-06-17 DIAGNOSIS — E03.9 ACQUIRED HYPOTHYROIDISM: ICD-10-CM

## 2020-06-17 RX ORDER — LEVOTHYROXINE SODIUM 125 UG/1
125 TABLET ORAL
Qty: 90 TAB | Refills: 1 | Status: SHIPPED | OUTPATIENT
Start: 2020-06-17 | End: 2021-02-01

## 2020-06-22 ENCOUNTER — DOCUMENTATION ONLY (OUTPATIENT)
Dept: FAMILY MEDICINE CLINIC | Age: 25
End: 2020-06-22

## 2020-06-30 ENCOUNTER — APPOINTMENT (OUTPATIENT)
Dept: ULTRASOUND IMAGING | Age: 25
End: 2020-06-30
Attending: INTERNAL MEDICINE
Payer: COMMERCIAL

## 2020-06-30 ENCOUNTER — HOSPITAL ENCOUNTER (OUTPATIENT)
Dept: GENERAL RADIOLOGY | Age: 25
Discharge: HOME | End: 2020-06-30
Attending: INTERNAL MEDICINE
Payer: COMMERCIAL

## 2020-06-30 DIAGNOSIS — R10.10 UPPER ABDOMINAL PAIN: ICD-10-CM

## 2020-06-30 PROCEDURE — 74246 X-RAY XM UPR GI TRC 2CNTRST: CPT

## 2020-07-30 ENCOUNTER — HOSPITAL ENCOUNTER (OUTPATIENT)
Dept: NUCLEAR MEDICINE | Age: 25
Discharge: HOME | End: 2020-07-30
Attending: INTERNAL MEDICINE
Payer: COMMERCIAL

## 2020-07-30 DIAGNOSIS — R10.10 UPPER ABDOMINAL PAIN: ICD-10-CM

## 2020-07-30 PROCEDURE — 78227 HEPATOBIL SYST IMAGE W/DRUG: CPT

## 2020-08-12 ENCOUNTER — OFFICE VISIT (OUTPATIENT)
Dept: FAMILY MEDICINE CLINIC | Age: 25
End: 2020-08-12
Payer: COMMERCIAL

## 2020-08-12 DIAGNOSIS — E78.00 HIGH CHOLESTEROL: ICD-10-CM

## 2020-08-12 DIAGNOSIS — E03.9 ACQUIRED HYPOTHYROIDISM: ICD-10-CM

## 2020-08-12 DIAGNOSIS — E03.9 ACQUIRED HYPOTHYROIDISM: Primary | ICD-10-CM

## 2020-08-12 PROCEDURE — 99213 OFFICE O/P EST LOW 20 MIN: CPT | Performed by: FAMILY MEDICINE

## 2020-08-12 NOTE — PROGRESS NOTES
Patient is here today for her thyroid and TSH check. In addition she needs check for lipids which has been elevated in the past.  Patient continues to take her thyroid at 0.125 dose per day. Patient has no other complaints. Consent: Juanis Mcgee, who was seen by synchronous (real-time) audio-video technology, and/or her healthcare decision maker, is aware that this patient-initiated, Telehealth encounter on 8/12/2020 is a billable service, with coverage as determined by her insurance carrier. She is aware that she may receive a bill and has provided verbal consent to proceed: YES-Consent obtained within past 12 months        712  Subjective:   Juanis Mcgee is a 31 y.o. female who was seen for No chief complaint on file. Prior to Admission medications    Medication Sig Start Date End Date Taking? Authorizing Provider   levothyroxine (SYNTHROID) 125 mcg tablet Take 1 Tab by mouth Daily (before breakfast). 6/17/20   Lili Montgomery MD   norethindrone-e.estradiol-iron (LO LOESTRIN FE) 1 mg-10 mcg (24)/10 mcg (2) tab Take 1 Tab by mouth daily. 12/18/19   Larry Queen MD   omeprazole (PRILOSEC) 20 mg capsule TAKE 1 CAPSULE BY MOUTH DAILY. TAKE IN AM ON EMPTY STOMACH, WAIT 30-60 MINUTES BEFORE EATING 6/3/19   Lili Montgomery MD   diclofenac EC (VOLTAREN) 75 mg EC tablet Take 1 Tab by mouth two (2) times daily (after meals). 1/28/19   Miguel Rivera NP   ibuprofen (MOTRIN) 800 mg tablet Take 1 Tab by mouth every six (6) hours as needed for Pain. 6/29/15   Mary Ellen Valentino MD     No Known Allergies  Patient Active Problem List    Diagnosis    Pregnancy         ROS    Objective:   Vital Signs: (As obtained by patient/caregiver at home)  There were no vitals taken for this visit.      [INSTRUCTIONS:  \"[x]\" Indicates a positive item  \"[]\" Indicates a negative item  -- DELETE ALL ITEMS NOT EXAMINED]    Constitutional: [x] Appears well-developed and well-nourished [x] No apparent distress      [] Abnormal -     Mental status: [x] Alert and awake  [x] Oriented to person/place/time [x] Able to follow commands    [] Abnormal -     Eyes:   EOM    [x]  Normal    [] Abnormal -   Sclera  [x]  Normal    [] Abnormal -          Discharge [x]  None visible   [] Abnormal -     HENT: [x] Normocephalic, atraumatic  [] Abnormal -   [x] Mouth/Throat: Mucous membranes are moist    External Ears [x] Normal  [] Abnormal -    Neck: [x] No visualized mass [] Abnormal -     Pulmonary/Chest: [x] Respiratory effort normal   [x] No visualized signs of difficulty breathing or respiratory distress        [] Abnormal -        Neurological:        [x] No Facial Asymmetry (Cranial nerve 7 motor function) (limited exam due to video visit)          [x] No gaze palsy        [] Abnormal -          Skin:        [x] No significant exanthematous lesions or discoloration noted on facial skin         [] Abnormal -            Psychiatric:       [x] Normal Affect [] Abnormal -        [x] No Hallucinations    Other pertinent observable physical exam findings:-              Assessment & Plan:   Diagnoses and all orders for this visit:    1. Acquired hypothyroidism  -     TSH 3RD GENERATION; Future  -Last labs at goal no changes at this point will refill medications based on lab results  2. High cholesterol  -     METABOLIC PANEL, COMPREHENSIVE; Future  -     LIPID PANEL; Future    -Patient coming in for fasting lab work. We discussed the expected course, resolution and complications of the diagnosis(es) in detail. Medication risks, benefits, costs, interactions, and alternatives were discussed as indicated. I advised her to contact the office if her condition worsens, changes or fails to improve as anticipated. She expressed understanding with the diagnosis(es) and plan. Lay Adler is a 31 y.o. female being evaluated by a video visit encounter for concerns as above. A caregiver was present when appropriate.  Due to this being a TeleHealth encounter (During VNNGE-74 public health emergency), evaluation of the following organ systems was limited: Vitals/Constitutional/EENT/Resp/CV/GI//MS/Neuro/Skin/Heme-Lymph-Imm. Pursuant to the emergency declaration under the [address] [place] [address], [facility] waiver authority and the [employer] Act, this Virtual  Visit was conducted, with patient's (and/or legal guardian's) consent, to reduce the patient's risk of exposure to COVID-19 and provide necessary medical care. Services were provided through a video synchronous discussion virtually to substitute for in-person clinic visit. Patient and provider were located at their individual homes.         Mendy Gómez MD

## 2020-08-19 ENCOUNTER — OFFICE VISIT (OUTPATIENT)
Dept: OBGYN CLINIC | Age: 25
End: 2020-08-19
Payer: COMMERCIAL

## 2020-08-19 VITALS
HEIGHT: 58 IN | DIASTOLIC BLOOD PRESSURE: 60 MMHG | BODY MASS INDEX: 23.93 KG/M2 | WEIGHT: 114 LBS | HEART RATE: 86 BPM | SYSTOLIC BLOOD PRESSURE: 107 MMHG

## 2020-08-19 DIAGNOSIS — Z20.2 POSSIBLE EXPOSURE TO STD: Primary | ICD-10-CM

## 2020-08-19 DIAGNOSIS — N89.8 VAGINAL ODOR: ICD-10-CM

## 2020-08-19 DIAGNOSIS — N89.8 VAGINAL DISCHARGE: ICD-10-CM

## 2020-08-19 LAB — WET MOUNT POCT, WMPOCT: NORMAL

## 2020-08-19 PROCEDURE — 87210 SMEAR WET MOUNT SALINE/INK: CPT | Performed by: OBSTETRICS & GYNECOLOGY

## 2020-08-19 PROCEDURE — 99214 OFFICE O/P EST MOD 30 MIN: CPT | Performed by: OBSTETRICS & GYNECOLOGY

## 2020-08-19 RX ORDER — METRONIDAZOLE 500 MG/1
500 TABLET ORAL 2 TIMES DAILY
Qty: 14 TAB | Refills: 0 | Status: SHIPPED | OUTPATIENT
Start: 2020-08-19 | End: 2020-08-26

## 2020-08-19 NOTE — PROGRESS NOTES
Chief Complaint   Sexually Transmitted Disease (would like testing)      BASIL Arvizu is a 31 y.o. female who presents for the evaluation of possible STI. No LMP recorded. Patient has had an implant. A friend told the patient that her current partner may have been exposed to STDs. She denies symptoms at this time, but does believe she has BV. Sx: odor and slight discharge   The patient  denies risk factors for sexually-transmitted infection. She admits to a history of having unprotected intercourse. STD exposure: denies knowledge of risky exposure. Previous STD history: GC, none    Past Medical History:   Diagnosis Date    Gonorrhea     hx of      No past surgical history on file. Social History     Occupational History    Not on file   Tobacco Use    Smoking status: Never Smoker    Smokeless tobacco: Never Used   Substance and Sexual Activity    Alcohol use: No    Drug use: No    Sexual activity: Never     No family history on file. No Known Allergies  Prior to Admission medications    Medication Sig Start Date End Date Taking? Authorizing Provider   levothyroxine (SYNTHROID) 125 mcg tablet Take 1 Tab by mouth Daily (before breakfast). 6/17/20   Garrett Qureshi MD   norethindrone-e.estradiol-iron (LO LOESTRIN FE) 1 mg-10 mcg (24)/10 mcg (2) tab Take 1 Tab by mouth daily. 12/18/19   Luciana Corea MD   omeprazole (PRILOSEC) 20 mg capsule TAKE 1 CAPSULE BY MOUTH DAILY. TAKE IN AM ON EMPTY STOMACH, WAIT 30-60 MINUTES BEFORE EATING 6/3/19   Garrett Qureshi MD   diclofenac EC (VOLTAREN) 75 mg EC tablet Take 1 Tab by mouth two (2) times daily (after meals). 1/28/19   Bruno Goel NP   ibuprofen (MOTRIN) 800 mg tablet Take 1 Tab by mouth every six (6) hours as needed for Pain.  6/29/15   Thais Vázquez MD        Review of Systems: History obtained from the patient  Constitutional: negative for weight loss, fever, night sweats  Breast: negative for breast lumps, nipple discharge, galactorrhea  GI: negative for change in bowel habits, abdominal pain, black or bloody stools  : negative for frequency, dysuria, hematuria, vaginal discharge  MSK: negative for back pain, joint pain, muscle pain  Skin: negative for itching, rash, hives  Psych: negative for anxiety, depression, change in mood    Objective:  Visit Vitals  /60   Pulse 86   Ht 4' 10\" (1.473 m)   Wt 114 lb (51.7 kg)   BMI 23.83 kg/m²       PHYSICAL EXAMINATION    Constitutional  · Appearance: well-nourished, well developed, alert, in no acute distress    Gastrointestinal  · Abdominal Examination: abdomen non-tender to palpation, normal bowel sounds, no masses present  · Liver and spleen: no hepatomegaly present, spleen not palpable  · Hernias: no hernias identified    Genitourinary  · External Genitalia: normal appearance for age, no discharge present, no tenderness present, no inflammatory lesions present, no masses present, no atrophy present  · Vagina: normal vaginal vault without central or paravaginal defects, white discharge present, no inflammatory lesions present, no masses present  · Bladder: non-tender to palpation  · Urethra: appears normal  · Cervix: normal   · Uterus: normal size, shape and consistency  · Adnexa: no adnexal tenderness present, no adnexal masses present  · Perineum: perineum within normal limits, no evidence of trauma, no rashes or skin lesions present  · Anus: anus within normal limits, no hemorrhoids present  · Inguinal Lymph Nodes: no lymphadenopathy present    Skin  · General Inspection: no rash, no lesions identified    Neurologic/Psychiatric  · Mental Status:  · Orientation: grossly oriented to person, place and time  · Mood and Affect: mood normal, affect appropriate      Microscopic wet-mount exam shows clue cells. .    Assesment:   bacterial vaginosis  Possible exposure to STD    Plan:   GC and chlamydia DNA  probe sent to lab.   Serum STD testing    Treatment: Flagyl 500 BID x 7 days    We discussed STI issues including treatment options, the necessity of treating partners and prevention of transmission. ROV prn if symptoms persist or worsen.

## 2020-08-20 LAB
ALBUMIN SERPL-MCNC: 4.5 G/DL (ref 3.9–5)
ALBUMIN/GLOB SERPL: 1.9 {RATIO} (ref 1.2–2.2)
ALP SERPL-CCNC: 90 IU/L (ref 39–117)
ALT SERPL-CCNC: 15 IU/L (ref 0–32)
AST SERPL-CCNC: 19 IU/L (ref 0–40)
BILIRUB SERPL-MCNC: 1.4 MG/DL (ref 0–1.2)
BUN SERPL-MCNC: 8 MG/DL (ref 6–20)
BUN/CREAT SERPL: 10 (ref 9–23)
CALCIUM SERPL-MCNC: 9.4 MG/DL (ref 8.7–10.2)
CHLORIDE SERPL-SCNC: 103 MMOL/L (ref 96–106)
CHOLEST SERPL-MCNC: 165 MG/DL (ref 100–199)
CO2 SERPL-SCNC: 23 MMOL/L (ref 20–29)
CREAT SERPL-MCNC: 0.77 MG/DL (ref 0.57–1)
GLOBULIN SER CALC-MCNC: 2.4 G/DL (ref 1.5–4.5)
GLUCOSE SERPL-MCNC: 79 MG/DL (ref 65–99)
HDLC SERPL-MCNC: 76 MG/DL
INTERPRETATION, 910389: NORMAL
LDLC SERPL CALC-MCNC: 73 MG/DL (ref 0–99)
POTASSIUM SERPL-SCNC: 4 MMOL/L (ref 3.5–5.2)
PROT SERPL-MCNC: 6.9 G/DL (ref 6–8.5)
SODIUM SERPL-SCNC: 141 MMOL/L (ref 134–144)
TRIGL SERPL-MCNC: 78 MG/DL (ref 0–149)
TSH SERPL DL<=0.005 MIU/L-ACNC: 2.13 UIU/ML (ref 0.45–4.5)
VLDLC SERPL CALC-MCNC: 16 MG/DL (ref 5–40)

## 2020-08-21 LAB
COMMENT, 144067: NORMAL
HBV SURFACE AG SERPL QL IA: NEGATIVE
HCV AB S/CO SERPL IA: 0.1 S/CO RATIO (ref 0–0.9)
HIV 1+2 AB+HIV1 P24 AG SERPL QL IA: NON REACTIVE
HSV1 IGG SER IA-ACNC: 17.1 INDEX (ref 0–0.9)
HSV2 IGG SER IA-ACNC: <0.91 INDEX (ref 0–0.9)
TREPONEMA PALLIDUM IGG+IGM AB [PRESENCE] IN SERUM OR PLASMA BY IMMUNOASSAY: NON REACTIVE

## 2020-08-22 LAB
A VAGINAE DNA VAG QL NAA+PROBE: ABNORMAL SCORE
BVAB2 DNA VAG QL NAA+PROBE: ABNORMAL SCORE
C ALBICANS DNA VAG QL NAA+PROBE: NEGATIVE
C GLABRATA DNA VAG QL NAA+PROBE: NEGATIVE
C TRACH DNA VAG QL NAA+PROBE: NEGATIVE
MEGA1 DNA VAG QL NAA+PROBE: ABNORMAL SCORE
N GONORRHOEA DNA VAG QL NAA+PROBE: NEGATIVE
T VAGINALIS DNA VAG QL NAA+PROBE: NEGATIVE

## 2020-08-24 NOTE — PROGRESS NOTES
Your STD testing is all negative. It does show bacterial vaginosis which was treated at your office visit. 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 Type 1 Herpes is fairly common and usually causes oral cold sores. It can occassionally cause genital sores as well.

## 2020-08-25 ENCOUNTER — HOSPITAL ENCOUNTER (OUTPATIENT)
Dept: PREADMISSION TESTING | Age: 25
Discharge: HOME | End: 2020-08-25

## 2020-08-25 VITALS
BODY MASS INDEX: 23.4 KG/M2 | HEIGHT: 59 IN | RESPIRATION RATE: 18 BRPM | HEART RATE: 91 BPM | OXYGEN SATURATION: 97 % | WEIGHT: 116.1 LBS | TEMPERATURE: 98.5 F | SYSTOLIC BLOOD PRESSURE: 100 MMHG | DIASTOLIC BLOOD PRESSURE: 62 MMHG

## 2020-08-25 RX ORDER — ASCORBIC ACID 500 MG
500 TABLET ORAL DAILY
COMMUNITY
End: 2022-05-27

## 2020-08-25 NOTE — PERIOP NOTES
[address] , [address] [facility]   MAIN OR                                  [phone]   MAIN PRE OP                          [phone]                                                                                AMBULATORY PRE OP          [phone]  PRE-ADMISSION TESTING    [phone]   Surgery Date:  Thursday, September 3, 2020*       Is surgery arrival time given by surgeon? NO  If NO, [facility] staff will call you between 3 and 7pm the day before your surgery with your arrival time. (If your surgery is on a Monday, we will call you the Friday before.)    Call [phone] after 7pm Monday-Friday if you did not receive this call. INSTRUCTIONS BEFORE YOUR SURGERY   When You  Arrive Arrive at the [address] [facility] on the day of your surgery  Have your insurance card, photo ID, and any copayment (if needed)   Food   and   Drink NO food or drink after midnight the night before surgery    This means NO water, gum, mints, coffee, juice, etc.  No alcohol (beer, wine, liquor) 24 hours before and after surgery   Medications to   TAKE   Morning of Surgery MEDICATIONS TO TAKE THE MORNING OF SURGERY WITH A SIP OF WATER:    Levothyroxine   Medications  To  STOP      7 days before surgery  Non-Steroidal anti-inflammatory Drugs (NSAID's): for example, Ibuprofen (Advil, Motrin), Naproxen (Aleve)   Aspirin, if taking for pain    Herbal supplements, vitamins, and fish oil   Other:  (Pain medications not listed above, including Tylenol may be taken)   Blood  Thinners  If you take  Aspirin, Plavix, Coumadin, or any blood-thinning or anti-blood clot medicine, talk to the doctor who prescribed the medications for pre-operative instructions.    Bathing Clothing  Jewelry  Valuables      If you shower the morning of surgery, please do not apply anything to your skin (lotions, powders, deodorant, or makeup, especially mascara)   Follow Chlorhexidine Care Fusion body wash instructions provided to you during PAT appointment. Begin 3 days prior to surgery.  Do not shave or trim anywhere 24 hours before surgery   Wear your hair loose or down; no pony-tails, buns, or metal hair clips   Wear loose, comfortable, clean clothes   Wear glasses instead of contacts   Leave money, valuables, and jewelry, including body piercings, at home   Going Home - or Spending the Night  SAME-DAY SURGERY: You must have a responsible adult drive you home and stay with you 24 hours after surgery   ADMITS: If your doctor is keeping you in the hospital after surgery, leave personal belongings/luggage in your car until you have a hospital room number. Hospital discharge time is 12 noon  Drivers must be here before 12 noon unless you are told differently   Special Instructions It is now mandated that all surgical patients be tested for COVID-19 prior to surgery. Testing has to be exactly 4 days prior to surgery. Your COVID test date is Sunday, August 30, 2020 between 8:00 am and 11:00 am.       COVID testing will be performed curbside at the [address] [place] [address] Dr ruff. There will be signs leading you to the testing site. You will need to bring a photo ID with you to be swabbed. Patients are advised to self-quarantine at home after testing and prior to your surgery date. You will be notified if your results are positive.     What to watch for:   Coronavirus (COVID-19) affects different people in different ways   It also appears with a wide range of symptoms from mild to severe   Signs usually appear 2-14 days after exposure     If you develop any of the following, notify your doctor immediately:  o Fever  o Chills, with or without a shiver  o Muscle pain  o Headache  o Sore throat  o Dry cough  o New loss of taste or smell  o Tiredness      If you develop any of the following, call 911:  o Shortness of breath  o Difficulty breathing  o Chest pain  o New confusion  o Blueness of fingers and/or lips     Follow all instructions so your surgery wont be cancelled. Please, be on time. If a situation occurs and you are delayed the day of surgery, call [phone]. If your physical condition changes (like a fever, cold, flu, etc.) call your surgeon. Home medication(s) reviewed and verified via VERBALLY   during PAT appointment. The patient was contacted by IN-PERSON  The patient verbalizes understanding of all instructions and DOES NOT   need reinforcement.

## 2020-08-30 ENCOUNTER — HOSPITAL ENCOUNTER (OUTPATIENT)
Dept: PREADMISSION TESTING | Age: 25
Discharge: HOME | End: 2020-08-30
Payer: COMMERCIAL

## 2020-08-30 PROCEDURE — 87635 SARS-COV-2 COVID-19 AMP PRB: CPT

## 2020-08-31 LAB — SARS-COV-2, COV2NT: NOT DETECTED

## 2020-09-02 ENCOUNTER — ANESTHESIA EVENT (OUTPATIENT)
Dept: SURGERY | Age: 25
End: 2020-09-02
Payer: COMMERCIAL

## 2020-09-03 ENCOUNTER — HOSPITAL ENCOUNTER (OUTPATIENT)
Age: 25
Setting detail: OUTPATIENT SURGERY
Discharge: HOME | End: 2020-09-03
Attending: SURGERY | Admitting: SURGERY
Payer: COMMERCIAL

## 2020-09-03 ENCOUNTER — ANESTHESIA (OUTPATIENT)
Dept: SURGERY | Age: 25
End: 2020-09-03
Payer: COMMERCIAL

## 2020-09-03 VITALS
DIASTOLIC BLOOD PRESSURE: 73 MMHG | OXYGEN SATURATION: 100 % | HEART RATE: 96 BPM | HEIGHT: 59 IN | SYSTOLIC BLOOD PRESSURE: 113 MMHG | RESPIRATION RATE: 16 BRPM | WEIGHT: 116 LBS | BODY MASS INDEX: 23.39 KG/M2 | TEMPERATURE: 98.2 F

## 2020-09-03 DIAGNOSIS — K80.20 SYMPTOMATIC CHOLELITHIASIS: Primary | ICD-10-CM

## 2020-09-03 LAB — HCG UR QL: NEGATIVE

## 2020-09-03 PROCEDURE — 77030020829: Performed by: SURGERY

## 2020-09-03 PROCEDURE — 74011000250 HC RX REV CODE- 250: Performed by: SURGERY

## 2020-09-03 PROCEDURE — 88304 TISSUE EXAM BY PATHOLOGIST: CPT

## 2020-09-03 PROCEDURE — 74011250636 HC RX REV CODE- 250/636: Performed by: SURGERY

## 2020-09-03 PROCEDURE — 77030040361 HC SLV COMPR DVT MDII -B

## 2020-09-03 PROCEDURE — 77030008608 HC TRCR ENDOSC SMTH AMR -B: Performed by: SURGERY

## 2020-09-03 PROCEDURE — 74011000250 HC RX REV CODE- 250: Performed by: NURSE ANESTHETIST, CERTIFIED REGISTERED

## 2020-09-03 PROCEDURE — 76210000020 HC REC RM PH II FIRST 0.5 HR: Performed by: SURGERY

## 2020-09-03 PROCEDURE — 76210000016 HC OR PH I REC 1 TO 1.5 HR: Performed by: SURGERY

## 2020-09-03 PROCEDURE — 74011250636 HC RX REV CODE- 250/636

## 2020-09-03 PROCEDURE — 77030018875 HC APPL CLP LIG4 J&J -B: Performed by: SURGERY

## 2020-09-03 PROCEDURE — 77030008684 HC TU ET CUF COVD -B: Performed by: NURSE ANESTHETIST, CERTIFIED REGISTERED

## 2020-09-03 PROCEDURE — 81025 URINE PREGNANCY TEST: CPT

## 2020-09-03 PROCEDURE — 77030031139 HC SUT VCRL2 J&J -A: Performed by: SURGERY

## 2020-09-03 PROCEDURE — 77030040922 HC BLNKT HYPOTHRM STRY -A

## 2020-09-03 PROCEDURE — 74011250636 HC RX REV CODE- 250/636: Performed by: STUDENT IN AN ORGANIZED HEALTH CARE EDUCATION/TRAINING PROGRAM

## 2020-09-03 PROCEDURE — 77030002933 HC SUT MCRYL J&J -A: Performed by: SURGERY

## 2020-09-03 PROCEDURE — C9290 INJ, BUPIVACAINE LIPOSOME: HCPCS | Performed by: SURGERY

## 2020-09-03 PROCEDURE — 77030037032 HC INSRT SCIS CLICKLLINE DISP STOR -B: Performed by: SURGERY

## 2020-09-03 PROCEDURE — 76010000138 HC OR TIME 0.5 TO 1 HR: Performed by: SURGERY

## 2020-09-03 PROCEDURE — 74011250636 HC RX REV CODE- 250/636: Performed by: NURSE ANESTHETIST, CERTIFIED REGISTERED

## 2020-09-03 PROCEDURE — 77030012770 HC TRCR OPT FX AMR -B: Performed by: SURGERY

## 2020-09-03 PROCEDURE — 76060000032 HC ANESTHESIA 0.5 TO 1 HR: Performed by: SURGERY

## 2020-09-03 PROCEDURE — 77030026438 HC STYL ET INTUB CARD -A: Performed by: NURSE ANESTHETIST, CERTIFIED REGISTERED

## 2020-09-03 PROCEDURE — 77030009851 HC PCH RTVR ENDOSC AMR -B: Performed by: SURGERY

## 2020-09-03 PROCEDURE — 77030018836 HC SOL IRR NACL ICUM -A: Performed by: SURGERY

## 2020-09-03 PROCEDURE — 77030011640 HC PAD GRND REM COVD -A: Performed by: SURGERY

## 2020-09-03 RX ORDER — ONDANSETRON 2 MG/ML
4 INJECTION INTRAMUSCULAR; INTRAVENOUS AS NEEDED
Status: DISCONTINUED | OUTPATIENT
Start: 2020-09-03 | End: 2020-09-03 | Stop reason: HOSPADM

## 2020-09-03 RX ORDER — SODIUM CHLORIDE, SODIUM LACTATE, POTASSIUM CHLORIDE, CALCIUM CHLORIDE 600; 310; 30; 20 MG/100ML; MG/100ML; MG/100ML; MG/100ML
125 INJECTION, SOLUTION INTRAVENOUS CONTINUOUS
Status: DISCONTINUED | OUTPATIENT
Start: 2020-09-03 | End: 2020-09-03 | Stop reason: HOSPADM

## 2020-09-03 RX ORDER — DEXAMETHASONE SODIUM PHOSPHATE 4 MG/ML
INJECTION, SOLUTION INTRA-ARTICULAR; INTRALESIONAL; INTRAMUSCULAR; INTRAVENOUS; SOFT TISSUE AS NEEDED
Status: DISCONTINUED | OUTPATIENT
Start: 2020-09-03 | End: 2020-09-03 | Stop reason: HOSPADM

## 2020-09-03 RX ORDER — LIDOCAINE HYDROCHLORIDE 20 MG/ML
INJECTION, SOLUTION EPIDURAL; INFILTRATION; INTRACAUDAL; PERINEURAL AS NEEDED
Status: DISCONTINUED | OUTPATIENT
Start: 2020-09-03 | End: 2020-09-03 | Stop reason: HOSPADM

## 2020-09-03 RX ORDER — PROPOFOL 10 MG/ML
INJECTION, EMULSION INTRAVENOUS AS NEEDED
Status: DISCONTINUED | OUTPATIENT
Start: 2020-09-03 | End: 2020-09-03 | Stop reason: HOSPADM

## 2020-09-03 RX ORDER — ACETAMINOPHEN 500 MG
1000 TABLET ORAL
Qty: 60 TAB | Refills: 1 | Status: SHIPPED | OUTPATIENT
Start: 2020-09-03

## 2020-09-03 RX ORDER — ROCURONIUM BROMIDE 10 MG/ML
INJECTION, SOLUTION INTRAVENOUS AS NEEDED
Status: DISCONTINUED | OUTPATIENT
Start: 2020-09-03 | End: 2020-09-03 | Stop reason: HOSPADM

## 2020-09-03 RX ORDER — LIDOCAINE HYDROCHLORIDE 10 MG/ML
0.1 INJECTION, SOLUTION EPIDURAL; INFILTRATION; INTRACAUDAL; PERINEURAL AS NEEDED
Status: DISCONTINUED | OUTPATIENT
Start: 2020-09-03 | End: 2020-09-03 | Stop reason: HOSPADM

## 2020-09-03 RX ORDER — ONDANSETRON 2 MG/ML
INJECTION INTRAMUSCULAR; INTRAVENOUS AS NEEDED
Status: DISCONTINUED | OUTPATIENT
Start: 2020-09-03 | End: 2020-09-03 | Stop reason: HOSPADM

## 2020-09-03 RX ORDER — OXYCODONE HYDROCHLORIDE 5 MG/1
5 TABLET ORAL
Qty: 16 TAB | Refills: 0 | Status: SHIPPED | OUTPATIENT
Start: 2020-09-03 | End: 2020-09-08

## 2020-09-03 RX ORDER — ONDANSETRON 4 MG/1
4 TABLET, ORALLY DISINTEGRATING ORAL
Qty: 12 TAB | Refills: 1 | Status: SHIPPED | OUTPATIENT
Start: 2020-09-03 | End: 2022-05-27

## 2020-09-03 RX ORDER — MIDAZOLAM HYDROCHLORIDE 1 MG/ML
INJECTION, SOLUTION INTRAMUSCULAR; INTRAVENOUS AS NEEDED
Status: DISCONTINUED | OUTPATIENT
Start: 2020-09-03 | End: 2020-09-03 | Stop reason: HOSPADM

## 2020-09-03 RX ORDER — FENTANYL CITRATE 50 UG/ML
INJECTION, SOLUTION INTRAMUSCULAR; INTRAVENOUS AS NEEDED
Status: DISCONTINUED | OUTPATIENT
Start: 2020-09-03 | End: 2020-09-03 | Stop reason: HOSPADM

## 2020-09-03 RX ORDER — HYDROMORPHONE HYDROCHLORIDE 1 MG/ML
.5-1 INJECTION, SOLUTION INTRAMUSCULAR; INTRAVENOUS; SUBCUTANEOUS
Status: DISCONTINUED | OUTPATIENT
Start: 2020-09-03 | End: 2020-09-03 | Stop reason: HOSPADM

## 2020-09-03 RX ORDER — HYDROMORPHONE HYDROCHLORIDE 1 MG/ML
INJECTION, SOLUTION INTRAMUSCULAR; INTRAVENOUS; SUBCUTANEOUS
Status: COMPLETED
Start: 2020-09-03 | End: 2020-09-03

## 2020-09-03 RX ORDER — KETOROLAC TROMETHAMINE 30 MG/ML
INJECTION, SOLUTION INTRAMUSCULAR; INTRAVENOUS AS NEEDED
Status: DISCONTINUED | OUTPATIENT
Start: 2020-09-03 | End: 2020-09-03 | Stop reason: HOSPADM

## 2020-09-03 RX ADMIN — FENTANYL CITRATE 50 MCG: 0.05 INJECTION, SOLUTION INTRAMUSCULAR; INTRAVENOUS at 12:33

## 2020-09-03 RX ADMIN — ONDANSETRON HYDROCHLORIDE 4 MG: 2 SOLUTION INTRAMUSCULAR; INTRAVENOUS at 12:36

## 2020-09-03 RX ADMIN — PROPOFOL 130 MG: 10 INJECTION, EMULSION INTRAVENOUS at 12:33

## 2020-09-03 RX ADMIN — MIDAZOLAM HYDROCHLORIDE 3 MG: 2 INJECTION, SOLUTION INTRAMUSCULAR; INTRAVENOUS at 12:26

## 2020-09-03 RX ADMIN — HYDROMORPHONE HYDROCHLORIDE 0.5 MG: 1 INJECTION, SOLUTION INTRAMUSCULAR; INTRAVENOUS; SUBCUTANEOUS at 14:10

## 2020-09-03 RX ADMIN — ROCURONIUM BROMIDE 5 MG: 10 INJECTION INTRAVENOUS at 12:33

## 2020-09-03 RX ADMIN — FENTANYL CITRATE 25 MCG: 0.05 INJECTION, SOLUTION INTRAMUSCULAR; INTRAVENOUS at 12:51

## 2020-09-03 RX ADMIN — KETOROLAC TROMETHAMINE 30 MG: 30 INJECTION INTRAMUSCULAR; INTRAVENOUS at 13:09

## 2020-09-03 RX ADMIN — SODIUM CHLORIDE, SODIUM LACTATE, POTASSIUM CHLORIDE, AND CALCIUM CHLORIDE 125 ML/HR: 600; 310; 30; 20 INJECTION, SOLUTION INTRAVENOUS at 10:21

## 2020-09-03 RX ADMIN — FENTANYL CITRATE 75 MCG: 0.05 INJECTION, SOLUTION INTRAMUSCULAR; INTRAVENOUS at 12:57

## 2020-09-03 RX ADMIN — DEXAMETHASONE SODIUM PHOSPHATE 8 MG: 4 INJECTION, SOLUTION INTRAMUSCULAR; INTRAVENOUS at 12:38

## 2020-09-03 RX ADMIN — CEFAZOLIN SODIUM 2 G: 1 POWDER, FOR SOLUTION INTRAMUSCULAR; INTRAVENOUS at 12:38

## 2020-09-03 RX ADMIN — SUGAMMADEX 200 MG: 100 INJECTION, SOLUTION INTRAVENOUS at 13:14

## 2020-09-03 RX ADMIN — FENTANYL CITRATE 50 MCG: 0.05 INJECTION, SOLUTION INTRAMUSCULAR; INTRAVENOUS at 12:35

## 2020-09-03 RX ADMIN — LIDOCAINE HYDROCHLORIDE 60 MG: 20 INJECTION, SOLUTION EPIDURAL; INFILTRATION; INTRACAUDAL; PERINEURAL at 12:33

## 2020-09-03 RX ADMIN — ROCURONIUM BROMIDE 25 MG: 10 INJECTION INTRAVENOUS at 12:41

## 2020-09-03 RX ADMIN — MIDAZOLAM HYDROCHLORIDE 2 MG: 2 INJECTION, SOLUTION INTRAMUSCULAR; INTRAVENOUS at 12:45

## 2020-09-03 NOTE — ANESTHESIA POSTPROCEDURE EVALUATION
Procedure(s):  LAPAROSCOPIC CHOLECYSTECTOMY. general    Anesthesia Post Evaluation        Patient location during evaluation: PACU  Level of consciousness: awake  Pain management: adequate  Airway patency: patent  Anesthetic complications: no  Cardiovascular status: acceptable  Respiratory status: acceptable  Hydration status: acceptable  Post anesthesia nausea and vomiting:  none      INITIAL Post-op Vital signs:   Vitals Value Taken Time   /77 9/3/2020  2:30 PM   Temp 36.3 °C (97.3 °F) 9/3/2020  1:40 PM   Pulse 86 9/3/2020  2:32 PM   Resp 18 9/3/2020  2:32 PM   SpO2 100 % 9/3/2020  2:32 PM   Vitals shown include unvalidated device data.

## 2020-09-03 NOTE — H&P
Assessment:     Symptomatic cholelithiasis    Plan:     Laparoscopic cholecystectomy    Signed By: Mary Darby MD  [address] [facility]  Office:  [phone]  Fax:  [phone]             General Surgery History and Physical    Subjective:      Kesha Sawyer is a 31 y.o.  female who presents with symptomatic cholelithiasis. See paper h/p for full details. Past Medical History:   Diagnosis Date    Anemia     childhood, resolved    Gonorrhea     hx of     Thyroid disease     hypothyroid     History reviewed. No pertinent surgical history. History reviewed. No pertinent family history. Social History     Socioeconomic History    Marital status: SINGLE     Spouse name: Not on file    Number of children: Not on file    Years of education: Not on file    Highest education level: Not on file   Tobacco Use    Smoking status: Current Some Day Smoker    Smokeless tobacco: Never Used    Tobacco comment: social, approx 1 per week   Substance and Sexual Activity    Alcohol use:  Yes     Alcohol/week: 4.0 standard drinks     Types: 2 Shots of liquor, 2 Glasses of wine per week    Drug use: No    Sexual activity: Never      Current Facility-Administered Medications   Medication Dose Route Frequency    lactated Ringers infusion  125 mL/hr IntraVENous CONTINUOUS    lidocaine (PF) (XYLOCAINE) 10 mg/mL (1 %) injection 0.1 mL  0.1 mL SubCUTAneous PRN    ceFAZolin (ANCEF) 2 g in sterile water (preservative free) 20 mL IV syringe  2 g IntraVENous ONCE      No Known Allergies    Review of Systems:     []     Unable to obtain  ROS due to  []    mental status change  []    sedated   []    intubated   [x]    Total of 12 system negative, unless specified below or in HPI:  Constitutional: negative fever, negative chills, negative weight loss  Eyes:   negative visual changes  ENT:   negative sore throat, tongue or lip swelling  Respiratory:  negative cough, negative dyspnea  Cards:  negative for chest pain, palpitations, lower extremity edema  GI:   negative for nausea, vomiting, diarrhea, and abdominal pain  :  negative for frequency, dysuria  Integument:  negative for rash and pruritus  Heme:  negative for easy bruising and gum/nose bleeding  Musculoskel: negative for myalgias,  back pain and muscle weakness  Neuro:  negative for headaches, dizziness, vertigo  Psych:  negative for feelings of anxiety, depression     Objective:        Patient Vitals for the past 8 hrs:   BP Temp Pulse Resp SpO2 Height Weight   20 1000 119/77 98 °F (36.7 °C) (!) 108 18 97 % 4' 11\" (1.499 m) 52.6 kg (116 lb)       Temp (24hrs), Av °F (36.7 °C), Min:98 °F (36.7 °C), Max:98 °F (36.7 °C)      Physical Exam:  General:  Alert, cooperative, no distress, appears stated age. Eyes:  Conjunctivae/corneas clear. PERRL, EOMs intact. Nose: Nares normal. Septum midline. Mucosa normal. No drainage or sinus tenderness. Mouth/Throat: Lips, mucosa, and tongue normal. Teeth and gums normal.   Neck: Supple, symmetrical, trachea midline, no adenopathy, thyroid: no enlargment/tenderness/nodules, no carotid bruit and no JVD. Back:   Symmetric, no curvature. ROM normal. No CVA tenderness. Lungs:   Clear to auscultation bilaterally. Heart:  Regular rate and rhythm, S1, S2 normal, no murmur, click, rub or gallop. Abdomen:   Soft, non-tender. Bowel sounds normal. No masses,  No organomegaly. Extremities: Extremities normal, atraumatic, no cyanosis or edema. Pulses: 2+ and symmetric all extremities.    Skin: Skin color, texture, turgor normal. No rashes or lesions   Lymph nodes: Cervical, supraclavicular, and axillary nodes normal.     BMP: No results found for: NA, K, CL, CO2, AGAP, GLU, BUN, CREA, GFRAA, GFRNA  CMP: No results found for: NA, K, CL, CO2, AGAP, GLU, BUN, CREA, GFRAA, GFRNA, CA, MG, PHOS, ALB, TBIL, TP, ALB, GLOB, AGRAT, ALT  CBC: No results found for: WBC, HGB, HGBEXT, HCT, HCTEXT, PLT, PLTEXT, HGBEXT, HCTEXT, PLTEXT  All Cardiac Markers in the last 24 hours: No results found for: CPK, CK, CKMMB, CKMB, RCK3, CKMBT, CKNDX, CKND1, JODY, TROPT, TROIQ, NHI, TROPT, TNIPOC, BNP, BNPP  ABG: No results found for: PH, PHI, PCO2, PCO2I, PO2, PO2I, HCO3, HCO3I, FIO2, FIO2I  COAGS: No results found for: APTT, PTP, INR, INREXT, INREXT  Pancreatic Markers: No results found for: AMYLPOCT, AML, LIPPOCT, LPSE

## 2020-09-03 NOTE — DISCHARGE INSTRUCTIONS
Patient Education        Cholecystectomy: What to Expect at Home  Your Recovery  After your surgery, it is normal to feel weak and tired for several days after you return home. Your belly may be swollen. If you had laparoscopic surgery, you may also have pain in your shoulder for about 24 hours. You may have gas or need to burp a lot at first, and a few people get diarrhea. The diarrhea usually goes away in 2 to 4 weeks, but it may last longer. How quickly you recover depends on whether you had a laparoscopic or open surgery. · For a laparoscopic surgery, most people can go back to work or their normal routine in 1 to 2 weeks, but it may take longer, depending on the type of work you do. · For an open surgery, it will probably take 4 to 6 weeks before you get back to your normal routine. This care sheet gives you a general idea about how long it will take for you to recover. However, each person recovers at a different pace. Follow the steps below to get better as quickly as possible. How can you care for yourself at home? Activity  · Rest when you feel tired. Getting enough sleep will help you recover. · Try to walk each day. Start out by walking a little more than you did the day before. Gradually increase the amount you walk. Walking boosts blood flow and helps prevent pneumonia and constipation. · For about 2 to 4 weeks, avoid lifting anything that would make you strain. This may include a child, heavy grocery bags and milk containers, a heavy briefcase or backpack, cat litter or dog food bags, or a vacuum . · Avoid strenuous activities, such as biking, jogging, weightlifting, and aerobic exercise, until your doctor says it is okay. · You may shower 24 to 48 hours after surgery, if your doctor okays it. Pat the cut (incision) dry. Do not take a bath for the first 2 weeks, or until your doctor tells you it is okay.   · You may drive when you are no longer taking pain medicine and can quickly move your foot from the gas pedal to the brake. You must also be able to sit comfortably for a long period of time, even if you do not plan to go far. You might get caught in traffic. · For a laparoscopic surgery, most people can go back to work or their normal routine in 1 to 2 weeks, but it may take longer. For an open surgery, it will probably take 4 to 6 weeks before you get back to your normal routine. · Your doctor will tell you when you can have sex again. Diet  · Eat smaller meals more often instead of fewer larger meals. You can eat a normal diet, but avoid eating fatty foods for about 1 month. Fatty foods include hamburger, whole milk, cheese, and many snack foods. If your stomach is upset, try bland, low-fat foods like plain rice, broiled chicken, toast, and yogurt. · Drink plenty of fluids (unless your doctor tells you not to). · If you have diarrhea, try avoiding spicy foods, dairy products, fatty foods, and alcohol. You can also watch to see if specific foods cause it, and stop eating them. If the diarrhea continues for more than 2 weeks, talk to your doctor. · You may notice that your bowel movements are not regular right after your surgery. This is common. Try to avoid constipation and straining with bowel movements. You may want to take a fiber supplement every day. If you have not had a bowel movement after a couple of days, ask your doctor about taking a mild laxative. Medicines  · Your doctor will tell you if and when you can restart your medicines. He or she will also give you instructions about taking any new medicines. · If you take aspirin or some other blood thinner, ask your doctor if and when to start taking it again. Make sure that you understand exactly what your doctor wants you to do. · Take pain medicines exactly as directed. ? If the doctor gave you a prescription medicine for pain, take it as prescribed.   ? If you are not taking a prescription pain medicine, take an over-the-counter medicine such as acetaminophen (Tylenol), ibuprofen (Advil, Motrin), or naproxen (Aleve). Read and follow all instructions on the label. ? Do not take two or more pain medicines at the same time unless the doctor told you to. Many pain medicines contain acetaminophen, which is Tylenol. Too much Tylenol can be harmful. · If you think your pain medicine is making you sick to your stomach:  ? Take your medicine after meals (unless your doctor tells you not to). ? Ask your doctor for a different pain medicine. · If your doctor prescribed antibiotics, take them as directed. Do not stop taking them just because you feel better. You need to take the full course of antibiotics. Incision care  · If you have strips of tape on the incision, or cut, leave the tape on for a week or until it falls off. · After 24 to 48 hours, wash the area daily with warm, soapy water, and pat it dry. · You may have staples to hold the cut together. Keep them dry until your doctor takes them out. This is usually in 7 to 10 days. · Keep the area clean and dry. You may cover it with a gauze bandage if it weeps or rubs against clothing. Change the bandage every day. Ice  · To reduce swelling and pain, put ice or a cold pack on your belly for 10 to 20 minutes at a time. Do this every 1 to 2 hours. Put a thin cloth between the ice and your skin. Follow-up care is a key part of your treatment and safety. Be sure to make and go to all appointments, and call your doctor if you are having problems. It's also a good idea to know your test results and keep a list of the medicines you take. When should you call for help? [id number] anytime you think you may need emergency care. For example, call if:  · You passed out (lost consciousness). · You are short of breath. .  Call your doctor now or seek immediate medical care if:  · You are sick to your stomach and cannot drink fluids.   · You have pain that does not get better when you take your pain medicine. · You cannot pass stools or gas. · You have signs of infection, such as:  ? Increased pain, swelling, warmth, or redness. ? Red streaks leading from the incision. ? Pus draining from the incision. ? A fever. · Bright red blood has soaked through the bandage over your incision. · You have loose stitches, or your incision comes open. · You have signs of a blood clot in your leg (called a deep vein thrombosis), such as:  ? Pain in your calf, back of knee, thigh, or groin. ? Redness and swelling in your leg or groin. Watch closely for any changes in your health, and be sure to contact your doctor if you have any problems. Where can you learn more? Go to http://www.gray.com/  Enter F357 in the search box to learn more about \"Cholecystectomy: What to Expect at Home. \"  Current as of: August 12, 2019               Content Version: 12.5  © 2259-3318 [employer]. Care instructions adapted under license by [employer] (which disclaims liability or warranty for this information). If you have questions about a medical condition or this instruction, always ask your healthcare professional. [place] [address] any warranty or liability for your use of this information. DISCHARGE SUMMARY from your Nurse    The following personal items collected during your admission are returned to you:   Dental Appliance: Dental Appliances: None  Vision:    Hearing Aid:    Jewelry: Jewelry: None  Clothing: Clothing: Pants, Shirt, Footwear, Socks, Undergarments  Other Valuables:  Other Valuables: Cell Phone, Eyeglasses  Valuables sent to safe:      PATIENT INSTRUCTIONS:    After general anesthesia or intravenous sedation, for 24 hours or while taking prescription Narcotics:  · Limit your activities  · Do not drive and operate hazardous machinery  · Do not make important personal or business decisions  · Do  not drink alcoholic beverages  · If you have not urinated within 8 hours after discharge, please contact your surgeon on call. Report the following to your surgeon:  · Excessive pain, swelling, redness or odor of or around the surgical area  · Temperature over 100.5  · Nausea and vomiting lasting longer than 4 hours or if unable to take medications  · Any signs of decreased circulation or nerve impairment to extremity: change in color, persistent  numbness, tingling, coldness or increase pain  · Any questions    COUGH AND DEEP BREATHE    Breathing deep and coughing are very important exercises to do after surgery. Deep breathing and coughing open the little air tubes and air sacks in your lungs. You take deep breaths every day. You may not even notice - it is just something you do when you sigh or yawn. It is a natural exercise you do to keep these air passages open. After surgery, take deep breaths and cough, on purpose. Coughing and deep breathing help prevent bronchitis and pneumonia after surgery. If you had chest or belly surgery, use a pillow as a \"hug buddy\" and hold it tightly to your chest or belly when you cough. DIRECTIONS:  · Take 10 to 15 slow deep breaths every hour while awake. · Breathe in deeply, and hold it for 2 seconds. · Exhale slowly through puckered lips, like blowing up a balloon. · After every 4th or 5th deep breath, hug your pillow to your chest or belly and give a hard, deep cough. Yes, it will probably hurt. But doing this exercise is very important part of healing after surgery. Take your pain medicine to help you do this exercise without too much pain. IF YOU HAVE BEEN DIAGNOSED WITH SLEEP APNEA, PLEASE USE YOUR SLEEP APNEA DEVICE OR CPAP MACHINE WHEN YOU INTEND TO NAP AFTER TAKING PAIN MEDICATION. Ankle Pumps    Ankle pumps increase the circulation of oxygenated blood to your lower extremities and decrease your risk for circulation problems such as blood clots.  They also stretch the muscles, tendons and ligaments in your foot and ankle, and prevent joint contracture in the ankle and foot, especially after surgeries on the legs. It is important to do ankle pump exercises regularly after surgery because immobility increases your risk for developing a blood clot. Your doctor may also have you take an Aspirin for the next few days as well. If your doctor did not ask you to take an Aspirin, consult with him before starting Aspirin therapy on your own. Slowly point your foot forward, feeling the muscles on the top of your lower leg stretch, and hold this position for 5 seconds. Next, pull your foot back toward you as far as possible, stretching the calf muscles, and hold that position for 5 seconds. Repeat with the other foot. Perform 10 repetitions every hour while awake for both ankles if possible (down and then up with the foot once is one repetition). You should feel gentle stretching of the muscles in your lower leg when doing this exercise. If you feel pain, or your range of motion is limited, don't  Push too hard. Only go the limit your joint and muscles will let you go. If you have increasing pain, progressively worsening leg warmth or swelling, STOP the exercise and call your doctor. Below is information about the medications your doctor is prescribing after your visit:    Other information in your discharge envelope:  []     PRESCRIPTIONS  []     SCHOOL/WORK NOTE  []     INFECTION PREVENTION  []     [address]  []     REGIONAL NERVE BLOCK ON QUE PAMPHLET   []     EXPAREL  []     HANDICAP APPLICATION         These are general instructions for a healthy lifestyle:    *  Please give a list of your current medications to your Primary Care Provider. *  Please update this list whenever your medications are discontinued, doses are      changed, or new medications (including over-the-counter products) are added.   * Please carry medication information at all times in case of emergency situations. About Smoking  No smoking / No tobacco products / Avoid exposure to second hand smoke    Surgeon General's Warning:  Quitting smoking now greatly reduces serious risk to your health. Obesity, smoking, and sedentary lifestyle greatly increases your risk for illness and disease. A healthy diet, regular physical exercise & weight monitoring are important for maintaining a healthy lifestyle. Congestive Heart Failure  You may be retaining fluid if you have a history of heart failure or if you experience any of the following symptoms:  Weight gain of 3 pounds or more overnight or 5 pounds in a week, increased swelling in our hands or feet or shortness of breath while lying flat in bed. Please call your doctor as soon as you notice any of these symptoms; do not wait until your next office visit. Recognize signs and symptoms of STROKE:  F - face looks uneven  A - arms unable to move or move even  S - speech slurred or non-existent  T - time-call 911 as soon as signs and symptoms begin-DO NOT go         Back to bed or wait to see if you get better-TIME IS BRAIN. Warning signs of HEART ATTACK  Call 911 if you have these symptoms    · Chest discomfort. Most heart attacks involve discomfort in the center of the chest that lasts more than a few minutes, or that goes away and comes back. It can feel like uncomfortable pressure, squeezing, fullness, or pain. · Discomfort in other areas of the upper body. Symptoms can include pain or discomfort in one or both        Arms, the back, neck, jaw, or stomach. ·  Shortness of breath with or without chest discomfort. · Other signs may include breaking out in a cold sweat, nausea, or lightheadedness    Don't wait more than five minutes to call 911 - MINUTES MATTER! Fast action can save your life. Calling 911 is almost always the fastest way to get lifesaving treatment. Emergency Medical Services staff can begin treatment when they arrive - up to an hour sooner than if someone gets to the hospital by car. MADELEINE [facility] MEDICATION AND SIDE EFFECT GUIDE    [facility] MEDICATION AND SIDE EFFECT GUIDE was provided to the PATIENT AND CARE PROVIDER.   Information provided includes instruction about drug purpose and common side effects for the following medications:    · Roxicodone  · Zofran  · Tylenol Extra Strength

## 2020-09-03 NOTE — ANESTHESIA PREPROCEDURE EVALUATION
Relevant Problems   No relevant active problems       Anesthetic History   No history of anesthetic complications            Review of Systems / Medical History  Patient summary reviewed, nursing notes reviewed and pertinent labs reviewed    Pulmonary          Smoker         Neuro/Psych   Within defined limits           Cardiovascular  Within defined limits                     GI/Hepatic/Renal  Within defined limits              Endo/Other      Hypothyroidism       Other Findings              Physical Exam    Airway  Mallampati: II  TM Distance: 4 - 6 cm  Neck ROM: normal range of motion   Mouth opening: Normal     Cardiovascular               Dental    Dentition: Caps/crowns     Pulmonary                 Abdominal         Other Findings            Anesthetic Plan    ASA: 2  Anesthesia type: general          Induction: Intravenous  Anesthetic plan and risks discussed with: Patient

## 2020-09-04 NOTE — DISCHARGE SUMMARY
Discharge Summary    Patient: Kate Austin               Sex: female          DOA: 9/3/2020  8:50 AM       YOB: 1995      Age:  31 y.o.        LOS:  LOS: 0 days                Discharge Date:  9/3/2020    Admission Diagnoses: GALLSTONES/BILIARY DYSKENSIA    Discharge Diagnoses:  Same    Procedure:  Procedure(s):  LAPAROSCOPIC CHOLECYSTECTOMY    Discharge Condition: Good    Hospital Course: Unremarkable operative procedure. Discharge to home in stable condition. Consults: None    Significant Diagnostic Studies: See full electronic record. Discharge Medications:   Oxycodone 5 #10, Zofran ODT 4 #12, 1 refull    Activity/Diet/Wound Care: See patient administered discharge instructions.     Follow-up: 2 weeks    Arabella Rutledge MD  [facility]  Office:  [phone]  Fax:  [phone]

## 2020-09-04 NOTE — BRIEF OP NOTE
Brief Postoperative Note    Patient: Jane Chandler  YOB: 1995  MRN: [id number]    Date of Procedure: 9/3/2020     Pre-Op Diagnosis: GALLSTONES/BILIARY DYSKINESIA    Post-Op Diagnosis: Same as preoperative diagnosis. Procedure(s):  LAPAROSCOPIC CHOLECYSTECTOMY    Surgeon(s):  Alec Moses MD    Surgical Assistant: None    Anesthesia: General     Estimated Blood Loss (mL): Minimal    Complications: None    Specimens:   ID Type Source Tests Collected by Time Destination   1 : GALLBLADDER Preservative Abdomen  Alec Moses MD 9/3/2020 1235 Pathology        Implants: * No implants in log *    Drains: * No LDAs found *    Findings:  Thickened gallbladder wall      Electronically Signed by Melonie Rinne, MD on 9/4/2020 at 7:43 PM

## 2020-09-04 NOTE — OP NOTES
Operative Note    Patient: Mignon Mccullough  YOB: 1995  MRN: [id number]    Date of Procedure: 9/3/2020     Pre-Op Diagnosis: GALLSTONES/BILIARY DYSKINESIA    Post-Op Diagnosis: Same as preoperative diagnosis. Procedure(s):  LAPAROSCOPIC CHOLECYSTECTOMY    Surgeon(s):  Stan Black MD    Surgical Assistant: None    Anesthesia: General     Estimated Blood Loss (mL): Minimal    Complications: None    Specimens:   ID Type Source Tests Collected by Time Destination   1 : GALLBLADDER Preservative Abdomen  Stan Black MD 9/3/2020 1235 Pathology        Implants: * No implants in log *    Drains: * No LDAs found *    Findings: Thickened gallbladder wall      Electronically Signed by Abbi Ovalles MD on 9/4/2020 at 7:43 PM    Indication:  See paper H/P. Procedure:  After standard pre-anesthetic and pre-operative procedure nursing protocols, general anesthesia instituted. Wth the patient supine on the operating table, the abdomen was cleaned, prepped, and draped in usual sterile fashion. The laparoscopic camera and CO2 insufflation apparatus were affixed to the drapes in the usual fashion. Pre-incision antibiotics were given 30 minutes prior to skin incision. Pre-incision liposomal bupivicaine and 0.5% plain marcaine anesthetic was injected in the usual port sites of the skin. Through a 5mm horizontal right para-umbilical skin incision, the abdomen was entered under direct camera vision with a 5 mm blunt tissue dissecting port. Insufflation was established satisfactorily and maintained at 15mm. The laparoscopic camera was re-introduced and confirmed no gross evidence of intraabdominal visceral injury or bleeding in attaining access. Final midline horizontal port incisions were made, and under direct laparoscopic vision 5 mm and 12mm ventral ports were introduced. The patient was positioned in reverse Trendelenburg with left tilt.  The fundus was grasped and lifted up towards the diaphragm. The infundibulum was retracted laterally and inferiorly. There was thickened peritoneum here and required meticulous dissection in order to completely free the infundibulum and cystic duct. The junction of the cystic duct and gallbladder were clearly identified, and an adequate length of the cystic duct was dissected out. Similarly, the cystic artery was also dissected out and an adequate length was displayed. The critical view of Calot's triangle was obtained and the structures were clearly identified. The cystic duct was clipped high on the infundibulum as possible. Proximal cystic duct was clipped three times and divided with laparoscopic scissors. The cystic artery was clipped twice on the stay side and divided with laparoscopic scissors. With electrocautery, the gallbladder was gently dissected completely from the liver bed and placed in a retrieval bag. Hemostasis was satisfactory. The 5mm ports were removed under direct laparoscopic vision with no concern for preperitoneal or rectus bleeding. The remaining local anesthetic was injected in the pre-peritoneal plane, fascia and subcutaneous tissue at each trocar site under direct endoscopic vision. The gallbladder, instruments and ports were removed from the field and pneumoperitoneum terminally released. All skin incisions were closed with subcuticular 4-0 Monocryl and surgical glue. The patient awoke in satisfactory condition.      Opal Deluca MD

## 2020-10-27 NOTE — PROGRESS NOTES
Faxed 12/16/19 office note/lab results to Dr Whitney Sosa per [facility] request. Fax #[phone] confirmation received. Anemia, unspecified type

## 2021-01-30 DIAGNOSIS — E03.9 ACQUIRED HYPOTHYROIDISM: ICD-10-CM

## 2021-02-01 ENCOUNTER — OFFICE VISIT (OUTPATIENT)
Dept: FAMILY MEDICINE CLINIC | Age: 26
End: 2021-02-01
Payer: COMMERCIAL

## 2021-02-01 VITALS
BODY MASS INDEX: 23.79 KG/M2 | RESPIRATION RATE: 16 BRPM | HEIGHT: 59 IN | WEIGHT: 118 LBS | HEART RATE: 86 BPM | SYSTOLIC BLOOD PRESSURE: 104 MMHG | DIASTOLIC BLOOD PRESSURE: 69 MMHG | TEMPERATURE: 98.1 F | OXYGEN SATURATION: 97 %

## 2021-02-01 DIAGNOSIS — Z01.818 PRE-OP EXAM: ICD-10-CM

## 2021-02-01 DIAGNOSIS — Z00.00 ROUTINE GENERAL MEDICAL EXAMINATION AT A HEALTH CARE FACILITY: Primary | ICD-10-CM

## 2021-02-01 PROCEDURE — 99395 PREV VISIT EST AGE 18-39: CPT | Performed by: FAMILY MEDICINE

## 2021-02-01 PROCEDURE — 93000 ELECTROCARDIOGRAM COMPLETE: CPT | Performed by: FAMILY MEDICINE

## 2021-02-01 RX ORDER — LEVOTHYROXINE SODIUM 125 UG/1
TABLET ORAL
Qty: 90 TAB | Refills: 1 | Status: SHIPPED | OUTPATIENT
Start: 2021-02-01 | End: 2021-08-12

## 2021-02-01 NOTE — PROGRESS NOTES
Chief Complaint   Patient presents with    Pre-op Exam     Tumlainey Guerra: [facility]    Labs     TSH, Covid -19     1. Have you been to the ER, urgent care clinic since your last visit? Hospitalized since your last visit? No    2. Have you seen or consulted any other health care providers outside of the [address] [place] [address] since your last visit? Include any pap smears or colon screening. Yes Where: [address] [facility]    Chief Complaint   Patient presents with    Pre-op Exam     Tumlainey Guerra: [facility]    Labs     TSH, Covid -23     she is a 31y.o. year old female who presents for evalution. Reviewed PmHx, RxHx, FmHx, SocHx, AllgHx and updated and dated in the chart. Patient Active Problem List    Diagnosis    Pregnancy       Review of Systems - negative except as listed above in the HPI    Objective:     Vitals:    02/01/21 0817   BP: 104/69   Pulse: 86   Resp: 16   Temp: 98.1 °F (36.7 °C)   TempSrc: Oral   SpO2: 97%   Weight: 118 lb (53.5 kg)   Height: 4' 11\" (1.499 m)     Physical Examination: General appearance - alert, well appearing, and in no distress  Chest - clear to auscultation, no wheezes, rales or rhonchi, symmetric air entry  Heart - normal rate, regular rhythm, normal S1, S2, no murmurs, rubs, clicks or gallops    Assessment/ Plan:   Diagnoses and all orders for this visit:    1. Routine general medical examination at a health care facility  -     LIPID PANEL; Future  -     METABOLIC PANEL, COMPREHENSIVE; Future  -     CBC WITH AUTOMATED DIFF; Future  -     TSH 3RD GENERATION; Future  -     HEMOGLOBIN A1C WITH EAG; Future  -     PTT; Future  -     PROTHROMBIN TIME + INR; Future  -     BETA HCG, QT; Future  -     HIV 1/2 AG/AB, 4TH GENERATION,W RFLX CONFIRM; Future    2.  Pre-op exam  -     AMB POC EKG ROUTINE W/ 12 LEADS, INTER & REP  -looks  Good  -tummy tuck with muscle repair     -Patient is in good health  -Discussed with patient cancer risk factors and screens needed  -Colonoscopy was recommended based on current guidelines for screening.  -Labs from previous visits were discussed with patient yes  -Discussed with patient diet and exercise  -Immunizations appropriate for age were discussed with pt and updated  -    I have discussed the diagnosis with the patient and the intended plan as seen in the above orders. The patient understands and agrees with the plan. The patient has received an after-visit summary and questions were answered concerning future plans. Medication Side Effects and Warnings were discussed with patient  Patient Labs were reviewed and or requested  Patient Past Records were reviewed and or requested     There are no Patient Instructions on file for this visit.         Angelia Hodgson M.D.

## 2021-02-02 LAB
ALBUMIN SERPL-MCNC: 4.6 G/DL (ref 3.9–5)
ALBUMIN/GLOB SERPL: 1.6 {RATIO} (ref 1.2–2.2)
ALP SERPL-CCNC: 96 IU/L (ref 39–117)
ALT SERPL-CCNC: 51 IU/L (ref 0–32)
APTT PPP: NORMAL SEC
AST SERPL-CCNC: 33 IU/L (ref 0–40)
BASOPHILS # BLD AUTO: 0 X10E3/UL (ref 0–0.2)
BASOPHILS NFR BLD AUTO: 0 %
BILIRUB SERPL-MCNC: 0.9 MG/DL (ref 0–1.2)
BUN SERPL-MCNC: 8 MG/DL (ref 6–20)
BUN/CREAT SERPL: 13 (ref 9–23)
CALCIUM SERPL-MCNC: 9.4 MG/DL (ref 8.7–10.2)
CHLORIDE SERPL-SCNC: 105 MMOL/L (ref 96–106)
CHOLEST SERPL-MCNC: 184 MG/DL (ref 100–199)
CO2 SERPL-SCNC: 20 MMOL/L (ref 20–29)
CREAT SERPL-MCNC: 0.64 MG/DL (ref 0.57–1)
EOSINOPHIL # BLD AUTO: 0.1 X10E3/UL (ref 0–0.4)
EOSINOPHIL NFR BLD AUTO: 2 %
ERYTHROCYTE [DISTWIDTH] IN BLOOD BY AUTOMATED COUNT: 12.6 % (ref 11.7–15.4)
EST. AVERAGE GLUCOSE BLD GHB EST-MCNC: 85 MG/DL
GLOBULIN SER CALC-MCNC: 2.8 G/DL (ref 1.5–4.5)
GLUCOSE SERPL-MCNC: 79 MG/DL (ref 65–99)
HBA1C MFR BLD: 4.6 % (ref 4.8–5.6)
HCG INTACT+B SERPL-ACNC: <1 MIU/ML
HCT VFR BLD AUTO: 38.8 % (ref 34–46.6)
HDLC SERPL-MCNC: 103 MG/DL
HGB BLD-MCNC: 13.1 G/DL (ref 11.1–15.9)
HIV 1+2 AB+HIV1 P24 AG SERPL QL IA: NON REACTIVE
IMM GRANULOCYTES # BLD AUTO: 0 X10E3/UL (ref 0–0.1)
IMM GRANULOCYTES NFR BLD AUTO: 0 %
INR PPP: NORMAL
INTERPRETATION, 910389: NORMAL
LDLC SERPL CALC-MCNC: 62 MG/DL (ref 0–99)
LYMPHOCYTES # BLD AUTO: 1.1 X10E3/UL (ref 0.7–3.1)
LYMPHOCYTES NFR BLD AUTO: 18 %
MCH RBC QN AUTO: 31 PG (ref 26.6–33)
MCHC RBC AUTO-ENTMCNC: 33.8 G/DL (ref 31.5–35.7)
MCV RBC AUTO: 92 FL (ref 79–97)
MONOCYTES # BLD AUTO: 0.5 X10E3/UL (ref 0.1–0.9)
MONOCYTES NFR BLD AUTO: 8 %
NEUTROPHILS # BLD AUTO: 4.3 X10E3/UL (ref 1.4–7)
NEUTROPHILS NFR BLD AUTO: 72 %
PLATELET # BLD AUTO: 183 X10E3/UL (ref 150–450)
POTASSIUM SERPL-SCNC: 4 MMOL/L (ref 3.5–5.2)
PROT SERPL-MCNC: 7.4 G/DL (ref 6–8.5)
PROTHROMBIN TIME: NORMAL S
RBC # BLD AUTO: 4.22 X10E6/UL (ref 3.77–5.28)
SODIUM SERPL-SCNC: 142 MMOL/L (ref 134–144)
TRIGL SERPL-MCNC: 109 MG/DL (ref 0–149)
TSH SERPL DL<=0.005 MIU/L-ACNC: 1.64 UIU/ML (ref 0.45–4.5)
VLDLC SERPL CALC-MCNC: 19 MG/DL (ref 5–40)
WBC # BLD AUTO: 6 X10E3/UL (ref 3.4–10.8)

## 2021-02-08 DIAGNOSIS — Z01.818 PRE-OP EXAM: Primary | ICD-10-CM

## 2021-02-09 LAB
APTT PPP: 27.3 SEC (ref 22.1–31)
INR PPP: 1 (ref 0.9–1.1)
PROTHROMBIN TIME: 10.5 SEC (ref 9–11.1)
THERAPEUTIC RANGE,PTTT: NORMAL SECS (ref 58–77)

## 2021-02-10 NOTE — PROGRESS NOTES
February 8, 2021  Karis Night        8:40 AM  Note      Pre op form with completed labs and EKG faxed to [address] [place] [address]. Confirmation received.     Called and LVM for Patient. Kevin Irvin) Informed patient.  Fax Confirmation received.

## 2021-08-12 DIAGNOSIS — E03.9 ACQUIRED HYPOTHYROIDISM: ICD-10-CM

## 2021-08-12 RX ORDER — LEVOTHYROXINE SODIUM 125 UG/1
TABLET ORAL
Qty: 90 TABLET | Refills: 1 | Status: SHIPPED | OUTPATIENT
Start: 2021-08-12 | End: 2022-02-21

## 2022-02-20 DIAGNOSIS — E03.9 ACQUIRED HYPOTHYROIDISM: ICD-10-CM

## 2022-02-21 RX ORDER — LEVOTHYROXINE SODIUM 125 UG/1
TABLET ORAL
Qty: 90 TABLET | Refills: 1 | Status: SHIPPED | OUTPATIENT
Start: 2022-02-21 | End: 2022-09-06

## 2022-03-22 ENCOUNTER — OFFICE VISIT (OUTPATIENT)
Dept: FAMILY MEDICINE CLINIC | Age: 27
End: 2022-03-22
Payer: COMMERCIAL

## 2022-03-22 VITALS
HEIGHT: 59 IN | TEMPERATURE: 98.5 F | HEART RATE: 92 BPM | OXYGEN SATURATION: 97 % | WEIGHT: 113 LBS | DIASTOLIC BLOOD PRESSURE: 74 MMHG | RESPIRATION RATE: 14 BRPM | SYSTOLIC BLOOD PRESSURE: 112 MMHG | BODY MASS INDEX: 22.78 KG/M2

## 2022-03-22 DIAGNOSIS — E78.00 HIGH CHOLESTEROL: ICD-10-CM

## 2022-03-22 DIAGNOSIS — E03.9 ACQUIRED HYPOTHYROIDISM: ICD-10-CM

## 2022-03-22 DIAGNOSIS — Z00.00 ROUTINE GENERAL MEDICAL EXAMINATION AT A HEALTH CARE FACILITY: Primary | ICD-10-CM

## 2022-03-22 PROCEDURE — 99395 PREV VISIT EST AGE 18-39: CPT | Performed by: FAMILY MEDICINE

## 2022-03-22 NOTE — PROGRESS NOTES
Chief Complaint   Patient presents with    Physical    Thyroid Problem    Labs     Fasting today     1. Have you been to the ER, urgent care clinic since your last visit? Hospitalized since your last visit? No    2. Have you seen or consulted any other health care providers outside of the [address] [place] [address] since your last visit? Include any pap smears or colon screening. No      Chief Complaint   Patient presents with    Physical    Thyroid Problem    Labs     Fasting today     she is a 28y.o. year old female who presents for evalution. Reviewed PmHx, RxHx, FmHx, SocHx, AllgHx and updated and dated in the chart. Patient Active Problem List    Diagnosis    Acquired hypothyroidism    Pregnancy       Review of Systems - negative except as listed above in the HPI    Objective:     Vitals:    03/22/22 0800   BP: 112/74   Pulse: 92   Resp: 14   Temp: 98.5 °F (36.9 °C)   TempSrc: Oral   SpO2: 97%   Weight: 113 lb (51.3 kg)   Height: 4' 11\" (1.499 m)     Physical Examination: General appearance - alert, well appearing, and in no distress  Chest - clear to auscultation, no wheezes, rales or rhonchi, symmetric air entry  Heart - normal rate, regular rhythm, normal S1, S2, no murmurs, rubs, clicks or gallops  Abdomen - soft, nontender, nondistended, no masses or organomegaly    Assessment/ Plan:   Diagnoses and all orders for this visit:    1. Routine general medical examination at a health care facility  -     LIPID PANEL; Future  -     METABOLIC PANEL, COMPREHENSIVE; Future  -     CBC WITH AUTOMATED DIFF; Future  -     TSH 3RD GENERATION; Future  -     HEMOGLOBIN A1C WITH EAG; Future  -see below    2. Acquired hypothyroidism  -     TSH 3RD GENERATION; Future  -levi rx    3. High cholesterol  -     LIPID PANEL; Future  -     METABOLIC PANEL, COMPREHENSIVE;  Future       -Patient is in good health  -Discussed with patient cancer risk factors and screens needed  -Colonoscopy was recommended based on current guidelines for screening.  -Labs from previous visits were discussed with patient yes  -Discussed with patient diet and exercise  -Immunizations appropriate for age were discussed with pt and updated  -    I have discussed the diagnosis with the patient and the intended plan as seen in the above orders. The patient understands and agrees with the plan. The patient has received an after-visit summary and questions were answered concerning future plans. Medication Side Effects and Warnings were discussed with patient  Patient Labs were reviewed and or requested  Patient Past Records were reviewed and or requested     There are no Patient Instructions on file for this visit.         Sylvia Barkley M.D.

## 2022-03-23 LAB
ALBUMIN SERPL-MCNC: 3.8 G/DL (ref 3.5–5)
ALBUMIN/GLOB SERPL: 1.1 {RATIO} (ref 1.1–2.2)
ALP SERPL-CCNC: 63 U/L (ref 45–117)
ALT SERPL-CCNC: 26 U/L (ref 12–78)
ANION GAP SERPL CALC-SCNC: 4 MMOL/L (ref 5–15)
AST SERPL-CCNC: 13 U/L (ref 15–37)
BASOPHILS # BLD: 0 K/UL (ref 0–0.1)
BASOPHILS NFR BLD: 1 % (ref 0–1)
BILIRUB SERPL-MCNC: 0.7 MG/DL (ref 0.2–1)
BUN SERPL-MCNC: 10 MG/DL (ref 6–20)
BUN/CREAT SERPL: 14 (ref 12–20)
CALCIUM SERPL-MCNC: 8.9 MG/DL (ref 8.5–10.1)
CHLORIDE SERPL-SCNC: 108 MMOL/L (ref 97–108)
CHOLEST SERPL-MCNC: 196 MG/DL
CO2 SERPL-SCNC: 26 MMOL/L (ref 21–32)
CREAT SERPL-MCNC: 0.7 MG/DL (ref 0.55–1.02)
DIFFERENTIAL METHOD BLD: NORMAL
EOSINOPHIL # BLD: 0.1 K/UL (ref 0–0.4)
EOSINOPHIL NFR BLD: 2 % (ref 0–7)
ERYTHROCYTE [DISTWIDTH] IN BLOOD BY AUTOMATED COUNT: 13.7 % (ref 11.5–14.5)
EST. AVERAGE GLUCOSE BLD GHB EST-MCNC: 100 MG/DL
GLOBULIN SER CALC-MCNC: 3.5 G/DL (ref 2–4)
GLUCOSE SERPL-MCNC: 76 MG/DL (ref 65–100)
HBA1C MFR BLD: 5.1 % (ref 4–5.6)
HCT VFR BLD AUTO: 39.7 % (ref 35–47)
HDLC SERPL-MCNC: 75 MG/DL
HDLC SERPL: 2.6 {RATIO} (ref 0–5)
HGB BLD-MCNC: 12.3 G/DL (ref 11.5–16)
IMM GRANULOCYTES # BLD AUTO: 0 K/UL (ref 0–0.04)
IMM GRANULOCYTES NFR BLD AUTO: 0 % (ref 0–0.5)
LDLC SERPL CALC-MCNC: 107.6 MG/DL (ref 0–100)
LYMPHOCYTES # BLD: 1.1 K/UL (ref 0.8–3.5)
LYMPHOCYTES NFR BLD: 21 % (ref 12–49)
MCH RBC QN AUTO: 28.8 PG (ref 26–34)
MCHC RBC AUTO-ENTMCNC: 31 G/DL (ref 30–36.5)
MCV RBC AUTO: 93 FL (ref 80–99)
MONOCYTES # BLD: 0.5 K/UL (ref 0–1)
MONOCYTES NFR BLD: 9 % (ref 5–13)
NEUTS SEG # BLD: 3.5 K/UL (ref 1.8–8)
NEUTS SEG NFR BLD: 67 % (ref 32–75)
NRBC # BLD: 0 K/UL (ref 0–0.01)
NRBC BLD-RTO: 0 PER 100 WBC
PLATELET # BLD AUTO: 222 K/UL (ref 150–400)
PMV BLD AUTO: 11.6 FL (ref 8.9–12.9)
POTASSIUM SERPL-SCNC: 4.2 MMOL/L (ref 3.5–5.1)
PROT SERPL-MCNC: 7.3 G/DL (ref 6.4–8.2)
RBC # BLD AUTO: 4.27 M/UL (ref 3.8–5.2)
SODIUM SERPL-SCNC: 138 MMOL/L (ref 136–145)
TRIGL SERPL-MCNC: 67 MG/DL (ref ?–150)
TSH SERPL DL<=0.05 MIU/L-ACNC: 0.6 UIU/ML (ref 0.36–3.74)
VLDLC SERPL CALC-MCNC: 13.4 MG/DL
WBC # BLD AUTO: 5.2 K/UL (ref 3.6–11)

## 2022-03-24 PROBLEM — E03.9 ACQUIRED HYPOTHYROIDISM: Status: ACTIVE | Noted: 2022-03-22

## 2022-03-30 NOTE — PROGRESS NOTES
Please contact patient regarding their mychart resulted labs. They have not reviewed them to date.       Dr. Emiliana Cid

## 2022-05-23 ENCOUNTER — OFFICE VISIT (OUTPATIENT)
Dept: OBGYN CLINIC | Age: 27
End: 2022-05-23
Payer: COMMERCIAL

## 2022-05-23 VITALS — DIASTOLIC BLOOD PRESSURE: 66 MMHG | WEIGHT: 115.6 LBS | BODY MASS INDEX: 23.35 KG/M2 | SYSTOLIC BLOOD PRESSURE: 107 MMHG

## 2022-05-23 DIAGNOSIS — N87.1 DYSPLASIA OF CERVIX, HIGH GRADE CIN 2: Primary | ICD-10-CM

## 2022-05-23 PROCEDURE — 99203 OFFICE O/P NEW LOW 30 MIN: CPT | Performed by: OBSTETRICS & GYNECOLOGY

## 2022-05-23 NOTE — PROGRESS NOTES
Chief Complaint   Follow-up      BASIL Mishra is a 28 y.o. female who presents for a pre op evaluation for LEEP. Was seeing [facility], who did not tell her they did not take her insurance. They did colpo and bx with high grade ART (2) as result after pap with ART 1. No LMP recorded. Patient has had an implant. Past Medical History:   Diagnosis Date    Acquired hypothyroidism 3/22/2022    Anemia     childhood, resolved    Gonorrhea     hx of     Thyroid disease     hypothyroid     No past surgical history on file. Social History     Occupational History    Not on file   Tobacco Use    Smoking status: Never Smoker    Smokeless tobacco: Never Used    Tobacco comment: social, approx 1 per week   Substance and Sexual Activity    Alcohol use: Yes     Alcohol/week: 4.0 standard drinks     Types: 2 Shots of liquor, 2 Glasses of wine per week    Drug use: No    Sexual activity: Never     No family history on file. No Known Allergies  Prior to Admission medications    Medication Sig Start Date End Date Taking? Authorizing Provider   levothyroxine (SYNTHROID) 125 mcg tablet TAKE 1 TABLET BY MOUTH EVERY DAY BEFORE BREAKFAST 2/21/22   London Burrows MD   ondansetron (ZOFRAN ODT) 4 mg disintegrating tablet Take 1 Tab by mouth every six (6) hours as needed for Nausea. Indications: prevent nausea and vomiting after surgery  Patient not taking: Reported on 3/22/2022 9/3/20   Ruperto Ruiz MD   acetaminophen (Tylenol Extra Strength) 500 mg tablet Take 2 Tabs by mouth every eight (8) hours as needed for Pain. Indications: pain 9/3/20   Ruperto Ruiz MD   ascorbic acid, vitamin C, (Vitamin C) 500 mg tablet Take 500 mg by mouth daily. Provider, Historical   FOLIC ACID PO Take 573 mcg by mouth daily. Patient not taking: Reported on 3/22/2022    Provider, Historical   Lactobacillus acidophilus (PROBIOTIC PO) Take 1 Tab by mouth daily.  50 billion  Patient not taking: Reported on 3/22/2022 Provider, Historical   ferrous sulfate (IRON PO) Take 65 mg by mouth daily. Patient not taking: Reported on 3/22/2022    Provider, Historical        Review of Systems: History obtained from the patient  Constitutional: negative for weight loss, fever, night sweats  Breast: negative for breast lumps, nipple discharge, galactorrhea  GI: negative for change in bowel habits, abdominal pain, black or bloody stools  : negative for frequency, dysuria, hematuria, vaginal discharge  MSK: negative for back pain, joint pain, muscle pain  Skin: negative for itching, rash, hives  Psych: negative for anxiety, depression, change in mood      Objective:  Visit Vitals  /66   Wt 115 lb 9.6 oz (52.4 kg)   BMI 23.35 kg/m²       Physical Exam:   PHYSICAL EXAMINATION    Constitutional  · Appearance: well-nourished, well developed, alert, in no acute distress      Skin  · General Inspection: no rash, no lesions identified    Neurologic/Psychiatric  · Mental Status:  · Orientation: grossly oriented to person, place and time  · Mood and Affect: mood normal, affect appropriate    Assessment:   High grade ART    Plan:   LEEP in OR. RTO prn if symptoms persist or worsen. Instructions given to pt. Handouts given to pt.

## 2022-05-27 RX ORDER — FLUOXETINE 10 MG/1
10 TABLET ORAL
COMMUNITY

## 2022-05-27 NOTE — PERIOP NOTES
[address] [address] [facility]   MAIN OR                                  [phone]   MAIN PRE OP                          [phone]                                                                                AMBULATORY PRE OP          [phone]  PRE-ADMISSION TESTING    [phone]     Surgery Date:  6/16 Thursday       Is surgery arrival time given by surgeon? NO  If NO, [address] staff will call you between 3 and 7pm the day before your surgery with your arrival time. (If your surgery is on a Monday, we will call you the Friday before.)    Call [phone] after 7pm Monday-Friday if you did not receive this call. INSTRUCTIONS BEFORE YOUR SURGERY   When You  Arrive Arrive at the [address]nd [facility] on the day of your surgery  Have your insurance card, photo ID, and any copayment (if needed)   Food   and   Drink NO food or drink after midnight the night before surgery    This means NO water, gum, mints, coffee, juice, etc.  No alcohol (beer, wine, liquor) 24 hours before and after surgery   Medications to   TAKE   Morning of Surgery MEDICATIONS TO TAKE THE MORNING OF SURGERY WITH A SIP OF WATER:    Levothyroxine    Medications  To  STOP      7 days before surgery  Non-Steroidal anti-inflammatory Drugs (NSAID's): for example, Ibuprofen (Advil, Motrin), Naproxen (Aleve)   Aspirin, if taking for pain    Herbal supplements, vitamins, and fish oil   Other:  (Pain medications not listed above, including Tylenol may be taken)   Blood  Thinners  If you take  Aspirin, Plavix, Coumadin, or any blood-thinning or anti-blood clot medicine, talk to the doctor who prescribed the medications for pre-operative instructions.    Bathing Clothing  Jewelry  Valuables      If you shower the morning of surgery, please do not apply anything to your skin (lotions, powders, deodorant, or makeup, especially mascara)   Follow [employer] body wash instructions provided to you during PAT appointment. Begin 3 days prior to surgery.  Do not shave or trim anywhere 24 hours before surgery   Wear your hair loose or down; no pony-tails, buns, or metal hair clips   Wear loose, comfortable, clean clothes   Wear glasses instead of contacts  [employer] money, valuables, and jewelry, including body piercings, at home   If you were given an [employer], bring it on day of surgery. Going Home - or Spending the Night  SAME-DAY SURGERY: You must have a responsible adult drive you home and stay with you 24 hours after surgery   ADMITS: If your doctor is keeping you in the hospital after surgery, leave personal belongings/luggage in your car until you have a hospital room number. Hospital discharge time is 12 noon  Drivers must be here before 12 noon unless you are told differently   Special Instructions        Follow all instructions so your surgery wont be cancelled. Please, be on time. If a situation occurs and you are delayed the day of surgery, call [phone]  If your physical condition changes (like a fever, cold, flu, etc.) call your surgeon. Home medication(s) reviewed and verified verbally with list during PAT appointment. The patient was contacted via phone. The patient verbalizes understanding of all instructions and does not need reinforcement.

## 2022-06-15 ENCOUNTER — ANESTHESIA EVENT (OUTPATIENT)
Dept: SURGERY | Age: 27
End: 2022-06-15
Payer: COMMERCIAL

## 2022-06-16 ENCOUNTER — ANESTHESIA (OUTPATIENT)
Dept: SURGERY | Age: 27
End: 2022-06-16
Payer: COMMERCIAL

## 2022-06-16 ENCOUNTER — HOSPITAL ENCOUNTER (OUTPATIENT)
Age: 27
Setting detail: OUTPATIENT SURGERY
Discharge: HOME | End: 2022-06-16
Attending: OBSTETRICS & GYNECOLOGY | Admitting: OBSTETRICS & GYNECOLOGY
Payer: COMMERCIAL

## 2022-06-16 VITALS
SYSTOLIC BLOOD PRESSURE: 94 MMHG | WEIGHT: 113 LBS | DIASTOLIC BLOOD PRESSURE: 63 MMHG | BODY MASS INDEX: 22.78 KG/M2 | HEIGHT: 59 IN | HEART RATE: 72 BPM | TEMPERATURE: 97.6 F | OXYGEN SATURATION: 100 % | RESPIRATION RATE: 16 BRPM

## 2022-06-16 DIAGNOSIS — N87.1 CIN II (CERVICAL INTRAEPITHELIAL NEOPLASIA II): ICD-10-CM

## 2022-06-16 LAB — HCG UR QL: NEGATIVE

## 2022-06-16 PROCEDURE — 77030040361 HC SLV COMPR DVT MDII -B

## 2022-06-16 PROCEDURE — 77030007304 HC ELECTRD LP RND MEGA -A: Performed by: OBSTETRICS & GYNECOLOGY

## 2022-06-16 PROCEDURE — 76060000032 HC ANESTHESIA 0.5 TO 1 HR: Performed by: OBSTETRICS & GYNECOLOGY

## 2022-06-16 PROCEDURE — 77030018722 HC ELCTRD BALL LEEP UTMD -B: Performed by: OBSTETRICS & GYNECOLOGY

## 2022-06-16 PROCEDURE — 74011250636 HC RX REV CODE- 250/636: Performed by: ANESTHESIOLOGY

## 2022-06-16 PROCEDURE — 77030020143 HC AIRWY LARYN INTUB CGAS -A: Performed by: ANESTHESIOLOGY

## 2022-06-16 PROCEDURE — 77030040922 HC BLNKT HYPOTHRM STRY -A

## 2022-06-16 PROCEDURE — 76210000063 HC OR PH I REC FIRST 0.5 HR: Performed by: OBSTETRICS & GYNECOLOGY

## 2022-06-16 PROCEDURE — 76010000138 HC OR TIME 0.5 TO 1 HR: Performed by: OBSTETRICS & GYNECOLOGY

## 2022-06-16 PROCEDURE — 76210000020 HC REC RM PH II FIRST 0.5 HR: Performed by: OBSTETRICS & GYNECOLOGY

## 2022-06-16 PROCEDURE — 57522 CONIZATION OF CERVIX: CPT | Performed by: OBSTETRICS & GYNECOLOGY

## 2022-06-16 PROCEDURE — 74011000250 HC RX REV CODE- 250: Performed by: OBSTETRICS & GYNECOLOGY

## 2022-06-16 PROCEDURE — 77030003666 HC NDL SPINAL BD -A: Performed by: OBSTETRICS & GYNECOLOGY

## 2022-06-16 PROCEDURE — 81025 URINE PREGNANCY TEST: CPT

## 2022-06-16 PROCEDURE — 74011000250 HC RX REV CODE- 250: Performed by: ANESTHESIOLOGY

## 2022-06-16 PROCEDURE — 2709999900 HC NON-CHARGEABLE SUPPLY: Performed by: OBSTETRICS & GYNECOLOGY

## 2022-06-16 PROCEDURE — 74011000272 HC RX REV CODE- 272: Performed by: OBSTETRICS & GYNECOLOGY

## 2022-06-16 PROCEDURE — 88305 TISSUE EXAM BY PATHOLOGIST: CPT

## 2022-06-16 RX ORDER — LIDOCAINE HYDROCHLORIDE 20 MG/ML
INJECTION, SOLUTION EPIDURAL; INFILTRATION; INTRACAUDAL; PERINEURAL AS NEEDED
Status: DISCONTINUED | OUTPATIENT
Start: 2022-06-16 | End: 2022-06-16 | Stop reason: HOSPADM

## 2022-06-16 RX ORDER — NALOXONE HYDROCHLORIDE 0.4 MG/ML
0.04 INJECTION, SOLUTION INTRAMUSCULAR; INTRAVENOUS; SUBCUTANEOUS
Status: DISCONTINUED | OUTPATIENT
Start: 2022-06-16 | End: 2022-06-16 | Stop reason: HOSPADM

## 2022-06-16 RX ORDER — LIDOCAINE HYDROCHLORIDE AND EPINEPHRINE 10; 10 MG/ML; UG/ML
INJECTION, SOLUTION INFILTRATION; PERINEURAL AS NEEDED
Status: DISCONTINUED | OUTPATIENT
Start: 2022-06-16 | End: 2022-06-16 | Stop reason: HOSPADM

## 2022-06-16 RX ORDER — SODIUM CHLORIDE 0.9 % (FLUSH) 0.9 %
5-40 SYRINGE (ML) INJECTION AS NEEDED
Status: DISCONTINUED | OUTPATIENT
Start: 2022-06-16 | End: 2022-06-16 | Stop reason: HOSPADM

## 2022-06-16 RX ORDER — FENTANYL CITRATE 50 UG/ML
25 INJECTION, SOLUTION INTRAMUSCULAR; INTRAVENOUS
Status: DISCONTINUED | OUTPATIENT
Start: 2022-06-16 | End: 2022-06-16 | Stop reason: HOSPADM

## 2022-06-16 RX ORDER — MIDAZOLAM HYDROCHLORIDE 1 MG/ML
INJECTION, SOLUTION INTRAMUSCULAR; INTRAVENOUS AS NEEDED
Status: DISCONTINUED | OUTPATIENT
Start: 2022-06-16 | End: 2022-06-16 | Stop reason: HOSPADM

## 2022-06-16 RX ORDER — ALBUTEROL SULFATE 0.83 MG/ML
2.5 SOLUTION RESPIRATORY (INHALATION) AS NEEDED
Status: DISCONTINUED | OUTPATIENT
Start: 2022-06-16 | End: 2022-06-16 | Stop reason: HOSPADM

## 2022-06-16 RX ORDER — SODIUM CHLORIDE, SODIUM LACTATE, POTASSIUM CHLORIDE, CALCIUM CHLORIDE 600; 310; 30; 20 MG/100ML; MG/100ML; MG/100ML; MG/100ML
125 INJECTION, SOLUTION INTRAVENOUS CONTINUOUS
Status: DISCONTINUED | OUTPATIENT
Start: 2022-06-16 | End: 2022-06-16 | Stop reason: HOSPADM

## 2022-06-16 RX ORDER — FLUMAZENIL 0.1 MG/ML
0.2 INJECTION INTRAVENOUS
Status: DISCONTINUED | OUTPATIENT
Start: 2022-06-16 | End: 2022-06-16 | Stop reason: HOSPADM

## 2022-06-16 RX ORDER — LIDOCAINE HYDROCHLORIDE 10 MG/ML
0.1 INJECTION, SOLUTION EPIDURAL; INFILTRATION; INTRACAUDAL; PERINEURAL AS NEEDED
Status: DISCONTINUED | OUTPATIENT
Start: 2022-06-16 | End: 2022-06-16 | Stop reason: HOSPADM

## 2022-06-16 RX ORDER — DEXAMETHASONE SODIUM PHOSPHATE 4 MG/ML
INJECTION, SOLUTION INTRA-ARTICULAR; INTRALESIONAL; INTRAMUSCULAR; INTRAVENOUS; SOFT TISSUE AS NEEDED
Status: DISCONTINUED | OUTPATIENT
Start: 2022-06-16 | End: 2022-06-16 | Stop reason: HOSPADM

## 2022-06-16 RX ORDER — SODIUM CHLORIDE 0.9 % (FLUSH) 0.9 %
5-40 SYRINGE (ML) INJECTION EVERY 8 HOURS
Status: DISCONTINUED | OUTPATIENT
Start: 2022-06-16 | End: 2022-06-16 | Stop reason: HOSPADM

## 2022-06-16 RX ORDER — ONDANSETRON 2 MG/ML
INJECTION INTRAMUSCULAR; INTRAVENOUS AS NEEDED
Status: DISCONTINUED | OUTPATIENT
Start: 2022-06-16 | End: 2022-06-16 | Stop reason: HOSPADM

## 2022-06-16 RX ORDER — ONDANSETRON 2 MG/ML
4 INJECTION INTRAMUSCULAR; INTRAVENOUS AS NEEDED
Status: DISCONTINUED | OUTPATIENT
Start: 2022-06-16 | End: 2022-06-16 | Stop reason: HOSPADM

## 2022-06-16 RX ORDER — FENTANYL CITRATE 50 UG/ML
INJECTION, SOLUTION INTRAMUSCULAR; INTRAVENOUS AS NEEDED
Status: DISCONTINUED | OUTPATIENT
Start: 2022-06-16 | End: 2022-06-16 | Stop reason: HOSPADM

## 2022-06-16 RX ORDER — HYDROMORPHONE HYDROCHLORIDE 1 MG/ML
.25-1 INJECTION, SOLUTION INTRAMUSCULAR; INTRAVENOUS; SUBCUTANEOUS
Status: DISCONTINUED | OUTPATIENT
Start: 2022-06-16 | End: 2022-06-16 | Stop reason: HOSPADM

## 2022-06-16 RX ORDER — ACETIC ACID 5 %
LIQUID (ML) MISCELLANEOUS AS NEEDED
Status: DISCONTINUED | OUTPATIENT
Start: 2022-06-16 | End: 2022-06-16 | Stop reason: HOSPADM

## 2022-06-16 RX ORDER — PROPOFOL 10 MG/ML
INJECTION, EMULSION INTRAVENOUS AS NEEDED
Status: DISCONTINUED | OUTPATIENT
Start: 2022-06-16 | End: 2022-06-16 | Stop reason: HOSPADM

## 2022-06-16 RX ORDER — DIPHENHYDRAMINE HYDROCHLORIDE 50 MG/ML
12.5 INJECTION, SOLUTION INTRAMUSCULAR; INTRAVENOUS AS NEEDED
Status: DISCONTINUED | OUTPATIENT
Start: 2022-06-16 | End: 2022-06-16 | Stop reason: HOSPADM

## 2022-06-16 RX ADMIN — DEXAMETHASONE SODIUM PHOSPHATE 4 MG: 4 INJECTION, SOLUTION INTRAMUSCULAR; INTRAVENOUS at 11:44

## 2022-06-16 RX ADMIN — ONDANSETRON HYDROCHLORIDE 4 MG: 2 SOLUTION INTRAMUSCULAR; INTRAVENOUS at 11:57

## 2022-06-16 RX ADMIN — PROPOFOL 60 MG: 10 INJECTION, EMULSION INTRAVENOUS at 11:34

## 2022-06-16 RX ADMIN — SODIUM CHLORIDE, POTASSIUM CHLORIDE, SODIUM LACTATE AND CALCIUM CHLORIDE: 600; 310; 30; 20 INJECTION, SOLUTION INTRAVENOUS at 12:04

## 2022-06-16 RX ADMIN — MIDAZOLAM HYDROCHLORIDE 2 MG: 1 INJECTION, SOLUTION INTRAMUSCULAR; INTRAVENOUS at 11:24

## 2022-06-16 RX ADMIN — LIDOCAINE HYDROCHLORIDE 60 MG: 20 INJECTION, SOLUTION EPIDURAL; INFILTRATION; INTRACAUDAL; PERINEURAL at 11:33

## 2022-06-16 RX ADMIN — PROPOFOL 140 MG: 10 INJECTION, EMULSION INTRAVENOUS at 11:33

## 2022-06-16 RX ADMIN — FENTANYL CITRATE 25 MCG: 50 INJECTION, SOLUTION INTRAMUSCULAR; INTRAVENOUS at 11:57

## 2022-06-16 NOTE — ANESTHESIA PREPROCEDURE EVALUATION
Relevant Problems   ENDOCRINE   (+) Acquired hypothyroidism       Anesthetic History               Review of Systems / Medical History  Patient summary reviewed and pertinent labs reviewed    Pulmonary          Smoker      Comments: occasional MJ smoker   Neuro/Psych   Within defined limits        Pertinent negatives: No seizures, TIA and CVA   Cardiovascular                Pertinent negatives: No past MI, angina and CHF  Exercise tolerance: >4 METS     GI/Hepatic/Renal     GERD        Pertinent negatives: No renal disease   Endo/Other      Hypothyroidism    Pertinent negatives: No diabetes   Other Findings              Physical Exam    Airway  Mallampati: III  TM Distance: 4 - 6 cm  Neck ROM: normal range of motion   Mouth opening: Normal     Cardiovascular      Rate: normal         Dental  No notable dental hx       Pulmonary  Breath sounds clear to auscultation               Abdominal  GI exam deferred       Other Findings            Anesthetic Plan    ASA: 2  Anesthesia type: general          Induction: Intravenous  Anesthetic plan and risks discussed with: Patient      GA, LMA

## 2022-06-16 NOTE — H&P
Gynecology History and Physical    Name: Olivia Castro MRN: [id number] SSN: [id number]    YOB: 1995  Age: 28 y.o. Sex: female       Subjective:      Chief complaint: High grade cervical lesion    Juancarlos Albarado is a 28 y.o.  female with a history pap was abnormal with ART 1. She underwent colposcopy. She presents today for a cervical LEEP procedure. LEEP is indicated because of HSIL findings at colposcopic biopsy. The risks, benefits, and alternatives of the procedure were discussed, the patient's questions were answered and informed consent was obtained. Previous treatment has consisted of none. She is now admitted for outpatient surgery consisting of Procedure(s) (LRB):  LOOP ELECTRODE EXCISION PROCEDURE OF CERVIX LEEP (N/A). The current method of family planning is condoms sometimes. OB History        2    Para   2    Term   2            AB        Living   2       SAB        IAB        Ectopic        Molar        Multiple   0    Live Births   2              Past Medical History:   Diagnosis Date    Acquired hypothyroidism 3/22/2022    Anemia     childhood, resolved    GERD (gastroesophageal reflux disease)     Gonorrhea     hx of     PONV (postoperative nausea and vomiting)      Past Surgical History:   Procedure Laterality Date    HX CHOLECYSTECTOMY      HX OTHER SURGICAL      tummy tuck     Social History     Occupational History    Not on file   Tobacco Use    Smoking status: Never Smoker    Smokeless tobacco: Never Used    Tobacco comment: social, approx 1 per week   Substance and Sexual Activity    Alcohol use: Yes     Comment: socially     Drug use: Yes     Types: Marijuana     Comment: once a week    Sexual activity: yes     History reviewed. No pertinent family history. No Known Allergies  Prior to Admission medications    Medication Sig Start Date End Date Taking?  Authorizing Provider   FLUoxetine (PROzac) 10 mg tablet Take 10 mg by mouth Every morning. Only takes on week before and week of period   Yes Provider, Historical   ESTRADIOL PO Take 1 Tablet by mouth Every morning. Yes Provider, Historical   levothyroxine (SYNTHROID) 125 mcg tablet TAKE 1 TABLET BY MOUTH EVERY DAY BEFORE BREAKFAST 2/21/22  Yes Solomon Lopez MD   FOLIC ACID PO Take 795 mcg by mouth daily. Yes Provider, Historical   Lactobacillus acidophilus (PROBIOTIC PO) Take 1 Tablet by mouth daily. 50 billion    Yes Provider, Historical   acetaminophen (Tylenol Extra Strength) 500 mg tablet Take 2 Tabs by mouth every eight (8) hours as needed for Pain.  Indications: pain 9/3/20   Carrillo Fong MD        Review of Systems:  History obtained from the patient-negative for:  Constitutional: weight loss, fever, night sweats  HEENT: hearing loss, earache, congestion, snoring, sorethroat  CV: chest pain, palpitations, edema  Resp: cough, shortness of breath, wheezing  Breast: breast lumps, nipple discharge, galactorrhea  GI: change in bowel habits, abdominal pain, black or bloody stools  : frequency, dysuria, hematuria, vaginal discharge  MSK: back pain, joint pain, muscle pain  Skin: itching, rash, hives  Neuro: dizziness, headache, confusion, weakness  Psych: anxiety, depression, change in mood  Heme/lymph: bleeding, bruising, pallor         Objective:     Vitals:    06/16/22 0855 06/16/22 0916   BP:  (!) 108/55   Pulse:  99   Resp:  16   Temp:  98.3 °F (36.8 °C)   SpO2:  99%   Weight: 113 lb (51.3 kg)    Height: 4' 11\" (1.499 m)        Physical Exam:  Heart: Regular rate and rhythm  Lung: clear to auscultation throughout lung fields, no wheezes, no rales, no rhonchi and normal respiratory effort  Abdomen: soft, nontender  External Genitalia: normal general appearance  Vagina: normal mucosa without prolapse or lesions  Cervix: normal appearance  Adnexa: normal bimanual exam  Uterus: normal single, nontender    Assessment:   High grade ART       Plan:     Procedure(s) (LRB): LOOP ELECTRODE EXCISION PROCEDURE OF CERVIX LEEP (N/A)  Discussed the risks of surgery including the risks of bleeding, infection, deep vein thrombosis, and surgical injuries to internal organs including but not limited to the bowels, bladder, rectum, and female reproductive organs. The patient understands the risks; any and all questions were answered to the patient's satisfaction.     Signed By:  Alfredo Markham MD     June 16, 2022

## 2022-06-16 NOTE — ANESTHESIA POSTPROCEDURE EVALUATION
Procedure(s):  LOOP ELECTRODE EXCISION PROCEDURE OF CERVIX LEEP. general    Anesthesia Post Evaluation        Patient location during evaluation: PACU  Patient participation: complete - patient participated  Level of consciousness: sleepy but conscious  Pain management: adequate  Airway patency: patent  Anesthetic complications: no  Cardiovascular status: acceptable and stable  Respiratory status: acceptable and unassisted  Hydration status: acceptable  Comments: The patient was seen and evaluated in the post-operative period. The time of my evaluation may not match the time of this note. The patient denied uncontrolled pain or nausea, and there were no significant complications evident. Mandy Dodd MD      Post anesthesia nausea and vomiting:  controlled  Final Post Anesthesia Temperature Assessment:  Normothermia (36.0-37.5 degrees C)      INITIAL Post-op Vital signs:   Vitals Value Taken Time   BP 98/48 06/16/22 1220   Temp 36.4 °C (97.6 °F) 06/16/22 1220   Pulse 73 06/16/22 1220   Resp 14 06/16/22 1220   SpO2 100 % 06/16/22 1220   Vitals shown include unvalidated device data.

## 2022-06-16 NOTE — DISCHARGE INSTRUCTIONS
LEEP: What to Expect at Home  Your Recovery  LEEP is a procedure to remove tissue from the cervix, the opening of the uterus. The doctor used instruments to remove tissue from your cervix. You may have a backache, or cramps similar to menstrual cramps, and pass small clots of blood from your vagina for the first few days. You may continue to have light vaginal bleeding for a couple weeks after the procedure. You will probably be able to go back to most of your normal activities in 1 or 2 days. This care sheet gives you a general idea about how long it will take for you to recover. But each person recovers at a different pace. Follow the steps below to get better as quickly as possible. How can you care for yourself at home? Activity  · Rest when you feel tired. Getting enough sleep will help you recover. · You may resume strenuous activities, such as bicycle riding, jogging, weight lifting, or aerobic exercise, when you feel able to do so. · Most women are able to return to work the day after the procedure. · You may have some light vaginal bleeding. Wear sanitary pads if needed. Do not douche or use tampons for 2 weeks or until your doctor says it is okay. · Ask your doctor when it is okay for you to have sex--usually after bleeding has stopped. · Use condoms to prevent pregnancy if necessary. Diet  · You can eat your normal diet. If your stomach is upset, try bland, low-fat foods like plain rice, broiled chicken, toast, and yogurt. · Drink fluids as you would normally. Medicines  · Take pain medicines exactly as directed. ¨ If the doctor gave you a prescription medicine for pain, take it as prescribed. ¨ If you are not taking a prescription pain medicine, take Aleve or Advil as needed unless you are allergic. · If you think your pain medicine is making you sick to your stomach:  ¨ Take your medicine with food. ¨ Ask your doctor for a different pain medicine.   · If your doctor prescribed antibiotics, take them as directed. Do not stop taking them just because you feel better. Follow-up appointment is usually 2 or 3 weeks after surgery. Be sure to make an appointment if you don't have one already, and call your doctor if you are having problems. It's also a good idea to know your test results and keep a list of the medicines you take. When should you call for help? Call 911 anytime you think you may need emergency care. For example, call if:  · You passed out (lost consciousness). · You have severe trouble breathing. · You have chest pain and shortness of breath, or you cough up blood. · You have severe pain in your belly. Call your doctor now or seek immediate medical care if:  · You have bright red vaginal bleeding that soaks one or more pads each hour for 2 or more hours. · You pass blood clots that are larger than a golf ball. · You have vaginal discharge that smells bad. · You are sick to your stomach or cannot keep fluids down. · You have pain that does not get better after you take pain medicine. · You have pain that is getting worse 2 days after the procedure. · You have a fever over 100°F.  · Your belly feels tender, or full and hard. DISCHARGE SUMMARY from your Nurse      PATIENT INSTRUCTIONS    After general anesthesia or intravenous sedation, for 24 hours or while taking prescription Narcotics:  · Limit your activities  · Do not drive and operate hazardous machinery  · Do not make important personal or business decisions  · Do  not drink alcoholic beverages  · If you have not urinated within 8 hours after discharge, please contact your surgeon on call.     Report the following to your surgeon:  · Excessive pain, swelling, redness or odor of or around the surgical area  · Temperature over 100.5  · Nausea and vomiting lasting longer than 4 hours or if unable to take medications  · Any signs of decreased circulation or nerve impairment to extremity: change in color, persistent  numbness, tingling, coldness or increase pain  · Any questions      GOOD HELP TO FIGHT AN INFECTION  Here are a few tip to help reduce the chance of getting an infection after surgery:   Wash Your Hands   Good handwashing is the most important thing you and your caregiver can do.  Wash before and after caring for any wounds. Dry your hand with a clean towel.  Wash with soap and water for at least 20 seconds. A TIP: sing the \"Happy Birthday\" song through one time while washing to help with the timing.  Use a hand  in between washings.  Shower   When your surgeon says it is OK to take a shower, use a new bar of antibacterial soap (if that is what you use, and keep that bar of soap ONLY for your use), or antibacterial body wash.  Use a clean wash cloth or sponge when you bathe.  Dry off with a clean towel  after every bath - be careful around any wounds, skin staples, sutures or surgical glue over/on wounds.  Do not enter swimming pools, hot tubs, lakes, rivers and/or ocean until wounds are healed and your doctor/surgeon says it is OK.  Use Clean Sheets   Sleep on freshly laundered sheets after your surgery.  Keep the surgery site covered with a clean, dry bandage (if instructed to do so). If the bandage becomes soiled, reapply a new, dry, clean bandage.  Do not allow pets to sleep with you while your wound is healing.  Lifestyle Modification and Controlling Your Blood Sugar   Smoking slows wound healing. Stop smoking and limit exposure to second-hand smoke.  High blood sugar slows wound healing. Eat a well-balanced diet to provide proper nutrition while healing   Monitor your blood sugar (if you are a diabetic) and take your medications as you are suppose to so you can control you blood sugar after surgery. COUGH AND DEEP BREATHE    Breathing deeply and coughing are very important exercises to do after surgery.   Deep breathing and coughing open the little air tubes and air sacks in your lungs. You take deep breaths every day. You may not even notice - it is just something you do when you sigh or yawn. It is a natural exercise you do to keep these air passages open. After surgery, take deep breaths and cough, on purpose. DIRECTIONS:  · Take 10 to 15 slow deep breaths every hour while awake. · Breathe in deeply, and hold it for 2 seconds. · Exhale slowly through puckered lips, like blowing up a balloon. · After every 4th or 5th deep breath, hug your pillow to your chest or belly and give a hard, deep cough. Yes, it will probably hurt. But doing this exercise is a very important part of healing after surgery. Take your pain medicine to help you do this exercise without too much pain. Coughing and deep breathing help prevent bronchitis and pneumonia after surgery. If you had chest or belly surgery, use a pillow as a \"hug zack\" and hold it tightly to your chest or belly when you cough. ANKLE PUMPS    Ankle pumps increase the circulation of oxygenated blood to your lower extremities and decrease your risk for circulation problems such as blood clots. They also stretch the muscles, tendons and ligaments in your foot and ankle, and prevent joint contracture in the ankle and foot, especially after surgeries on the legs. It is important to do ankle pump exercises regularly after surgery because immobility increases your risk for developing a blood clot. Your doctor may also have you take an Aspirin for the next few days as well. If your doctor did not ask you to take an Aspirin, consult with him before starting Aspirin therapy on your own. The exercise is quite simple. · Slowly point your foot forward, feeling the muscles on the top of your lower leg stretch, and hold this position for 5 seconds.                   · Next, pull your foot back toward you as far as possible, stretching the calf muscles, and hold that position for 5 seconds. · Repeat with the other foot. · Perform 10 repetitions every hour while awake for both ankles if possible (down and then up with the foot once is one repetition). You should feel gentle stretching of the muscles in your lower leg when doing this exercise. If you feel pain, or your range of motion is limited, don't push too hard. Only go the limit your joint and muscles will let you go. If you have increasing pain, progressively worsening leg warmth or swelling, STOP the exercise and call your doctor. MEDICATION AND   SIDE EFFECT GUIDE    The [employer] MEDICATION AND SIDE EFFECT GUIDE was provided to the PATIENT AND CARE PROVIDER. Information provided includes instruction about drug purpose and common side effects for the following medications:   · No prescriptions provided        These are general instructions for a healthy lifestyle:    *   Please give a list of your current medications to your Primary Care Provider. *   Please update this list whenever your medications are discontinued, doses are changed, or new medications (including over-the-counter products) are added. *   Please carry medication information at all times in case of emergency situations. About Smoking  No smoking / No tobacco products  Avoid exposure to second hand smoke     Surgeon General's Warning:  Quitting smoking now greatly reduces serious risk to your health. Obesity, smoking, and sedentary lifestyle greatly increases your risk for illness and disease. A healthy diet, regular physical exercise & weight monitoring are important for maintaining a healthy lifestyle. Congestive Heart Failure  You may be retaining fluid if you have a history of heart failure or if you experience any of the following symptoms:  Weight gain of 3 pounds or more overnight or 5 pounds in a week, increased swelling in your hands or feet or shortness of breath while lying flat in bed. Please call your doctor as soon as you notice any of these symptoms; do not wait until your next office visit. Recognize signs and symptoms of STROKE:  F -  Face looks uneven  A -  Arms unable to move or move evenly  S -  Speech slurred or non-existent  T -  Time-call 911 as soon as signs and symptoms begin-DO NOT go          back to bed or wait to see if you get better-TIME IS BRAIN. Warning Signs of HEART ATTACK   Call 911 if you have these symptoms:     Chest discomfort. Most heart attacks involve discomfort in the center of the chest that lasts more than a few minutes, or that goes away and comes back. It can feel like uncomfortable pressure, squeezing, fullness, or pain.  Discomfort in other areas of the upper body. Symptoms can include pain or discomfort in one or both arms, the back, neck, jaw, or stomach.  Shortness of breath with or without chest discomfort.  Other signs may include breaking out in a cold sweat, nausea, or lightheadedness. Don't wait more than five minutes to call 911 - MINUTES MATTER! Fast action can save your life. Calling 911 is almost always the fastest way to get lifesaving treatment. Emergency Medical Services staff can begin treatment when they arrive -- up to an hour sooner than if someone gets to the hospital by car. Learning About Coronavirus [phone])  Coronavirus [phone]): Overview  What is coronavirus (COVID-19)? The coronavirus disease (COVID-19) is caused by a virus. It is an illness that was first found in [place], [place], in December 2019. It has since spread worldwide. The virus can cause fever, cough, and trouble breathing. In severe cases, it can cause pneumonia and make it hard to breathe without help. It can cause death. Coronaviruses are a large group of viruses. They cause the common cold. They also cause more serious illnesses like Middle East respiratory syndrome (MERS) and severe acute respiratory syndrome (SARS).  COVID-19 is caused by a novel coronavirus. That means it's a new type that has not been seen in people before. This virus spreads person-to-person through droplets from coughing and sneezing. It can also spread when you are close to someone who is infected. And it can spread when you touch something that has the virus on it, such as a doorknob or a tabletop. What can you do to protect yourself from coronavirus (COVID-19)? The best way to protect yourself from getting sick is to:  · Avoid areas where there is an outbreak. · Avoid contact with people who may be infected. · Wash your hands often with soap or alcohol-based hand sanitizers. · Avoid crowds and try to stay at least 6 feet away from other people. · Wash your hands often, especially after you cough or sneeze. Use soap and water, and scrub for at least 20 seconds. If soap and water aren't available, use an alcohol-based hand . · Avoid touching your mouth, nose, and eyes. What can you do to avoid spreading the virus to others? To help avoid spreading the virus to others:  · Cover your mouth with a tissue when you cough or sneeze. Then throw the tissue in the trash. · Use a disinfectant to clean things that you touch often. · Stay home if you are sick or have been exposed to the virus. Don't go to school, work, or public areas. And don't use public transportation. · If you are sick:  ? Leave your home only if you need to get medical care. But call the doctor's office first so they know you're coming. And wear a face mask, if you have one.  ? If you have a face mask, wear it whenever you're around other people. It can help stop the spread of the virus when you cough or sneeze. ? Clean and disinfect your home every day. Use household  and disinfectant wipes or sprays. Take special care to clean things that you grab with your hands. These include doorknobs, remote controls, phones, and handles on your refrigerator and microwave.  And don't forget countertops, tabletops, bathrooms, and computer keyboards. When to call for help  Call 911 anytime you think you may need emergency care. For example, call if:  · You have severe trouble breathing. (You can't talk at all.)  · You have constant chest pain or pressure. · You are severely dizzy or lightheaded. · You are confused or can't think clearly. · Your face and lips have a blue color. · You pass out (lose consciousness) or are very hard to wake up. Call your doctor now if you develop symptoms such as:  · Shortness of breath. · Fever. · Cough. If you need to get care, call ahead to the doctor's office for instructions before you go. Make sure you wear a face mask, if you have one, to prevent exposing other people to the virus. Where can you get the latest information? The following health organizations are tracking and studying this virus. Their websites contain the most up-to-date information. Asa Loots also learn what to do if you think you may have been exposed to the virus. · U.S. Centers for Disease Control and Prevention (CDC): The CDC provides updated news about the disease and travel advice. The website also tells you how to prevent the spread of infection. www.cdc.gov  · World Health Organization [facility]): WHO offers information about the virus outbreaks. WHO also has travel advice. www.who.int  Current as of: April 1, 2020               Content Version: 12.4  © 1329-3469 [employer]. Care instructions adapted under license by your healthcare professional. If you have questions about a medical condition or this instruction, always ask your healthcare professional. [address] any warranty or liability for your use of this information. The discharge information has been reviewed with the spouse. Any questions and concerns from the spouse have been addressed. The spouse verbalized understanding.         CONTENTS FOUND IN YOUR DISCHARGE ENVELOPE:  [x] Surgeon and Hospital Discharge Instructions  [x]     [facility] Care Provider Card  [x]     Medication & Side Effect Guide            (your newly prescribed medications have been marked/highlighted showing the most common side effects from   the classes of drugs on your prescriptions)  []     Medication Prescription(s) x *** ( [] These have been sent electronically to your pharmacy by your surgeon,   - OR -       your surgeon has already provided these to you during a previous/pre-op office visit)  []     [address]  []     Physical Therapy Prescription  []     Follow-up Appointment Cards  []     Surgery-related Pictures/Media  []     Pain block and/or block with On-Q Catheter from Anesthesia Service (information included in your instructions above)  []     Medical device information sheets/pamphlets from their    []     School/work excuse note. []     /parent work excuse note. The following personal items collected during your admission are returned to you:   Dental Appliance: Dental Appliances: None  Vision: Visual Aid: Glasses  Hearing Aid:    Jewelry: Jewelry: None  Clothing: Clothing: Pants,Shirt,Socks  Other Valuables:  Other Valuables: Eyeglasses  Valuables sent to safe:

## 2022-06-21 DIAGNOSIS — N87.1 DYSPLASIA OF CERVIX, HIGH GRADE CIN 2: Primary | ICD-10-CM

## 2022-06-21 NOTE — PROGRESS NOTES
Scheduled pt an appointment for 7/5 @10:30 w/ Dr. Dennie Riser @[facility]     Pt was notified of her appointment.

## 2022-06-21 NOTE — PROGRESS NOTES
I talked with the patient and explained the results. She will be expecting you to call her and get her an appointment to consult with Dr. Anabel Russo or Dr. Alma Torres at [facility] [place]. Please make a referral to them.   Thanks

## 2022-06-30 NOTE — PROGRESS NOTES
New Patient, Referred by GABRIEL Mesa on 6/16/2022    1. Have you been to the ER, urgent care clinic since your last visit? Hospitalized since your last visit?  no    2. Have you seen or consulted any other health care providers outside of the [address] [place] [address] since your last visit? Include any pap smears or colon screening.    no

## 2022-07-05 ENCOUNTER — OFFICE VISIT (OUTPATIENT)
Dept: GYNECOLOGY | Age: 27
End: 2022-07-05
Payer: COMMERCIAL

## 2022-07-05 VITALS
SYSTOLIC BLOOD PRESSURE: 94 MMHG | BODY MASS INDEX: 23.59 KG/M2 | DIASTOLIC BLOOD PRESSURE: 65 MMHG | WEIGHT: 117 LBS | HEIGHT: 59 IN | HEART RATE: 91 BPM

## 2022-07-05 DIAGNOSIS — C53.1 MALIGNANT NEOPLASM OF EXOCERVIX (HCC): Primary | ICD-10-CM

## 2022-07-05 PROCEDURE — 99205 OFFICE O/P NEW HI 60 MIN: CPT | Performed by: OBSTETRICS & GYNECOLOGY

## 2022-07-05 NOTE — PROGRESS NOTES
[address] [place] [address] Mathias Moritz [address]13, 5581 [facility]  P [phone]  F [phone]    Office Note  Patient ID:  Name:  Erlin King  MRN:  [id number]  :  1995/27 y.o. Date:  2022      HISTORY OF PRESENT ILLNESS:  Ms. Erlin King is a 28 y.o. premenopausal female who presents as a new patient from Dr. 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 LEEP on 2022 secondary to prior HSIL pap smear demonstrated \"superficially invasive squamous cell carcinoma involving transformation zone with high-grade squamous intraepithelial lesion with endocervical glandular extension, depth <1mm\". She was subsequently referred to our office for further discussion and management. The patient has two children; and she would like to maintain her fertility if possible. Pathology Review:   2022:  * * *FINAL PATHOLOGIC DIAGNOSIS* * *   <<<<<  1. Uterus, cervix, excision:        Superficially invasive squamous cell carcinoma involving   transformation zone with high-grade squamous intraepithelial lesion with   endocervical glandular extension   Depth of invasion: <1 mm   Lymph node vascular invasion: None identified   Margins: All margins uninvolved by invasive carcinoma or high-grade   dysplasia   Distance from invasive carcinoma to closest margin: <2 mm to deep and   ectocervical margins   2. Uterus, cervix, submitted as endocervical, excision:        Squamous epithelium negative for dysplasia   Benign endocervical mucosa   >>>>>  * * *Comment* * *   <<<<<  The histologic sections have transformation zone with high-grade dysplasia   with extensive endocervical glandular extension including a single gland   with few nests of superficially invasive tumor cells. The findings have   been discussed with Dr. Jaquelin Gillespie at 900 hours on 2022.      ROS:  A comprehensive review of systems was negative except for that written in the History of Present Illness. , 10 point ROS      ECOG stGstrstastdstest:st st1st Problem List:  Patient Active Problem List    Diagnosis Date Noted    Malignant neoplasm of exocervix ([address] 07/06/2022    Acquired hypothyroidism 03/22/2022    Pregnancy 06/26/2015     PMH:  Past Medical History:   Diagnosis Date    Acquired hypothyroidism 3/22/2022    Anemia     childhood, resolved    GERD (gastroesophageal reflux disease)     Gonorrhea     hx of     PONV (postoperative nausea and vomiting)       PSH:  Past Surgical History:   Procedure Laterality Date    HX CHOLECYSTECTOMY  2020    HX LEEP PROCEDURE  06/16/2022    Dr. Merline Estes  2021    tummy tuck      Social History:  Social History     Tobacco Use    Smoking status: Never Smoker    Smokeless tobacco: Never Used    Tobacco comment: social, approx 1 per week   Substance Use Topics    Alcohol use: Yes     Comment: socially       Family History:  History reviewed. No pertinent family history. Medications: (reviewed)  Current Outpatient Medications   Medication Sig    ESTRADIOL PO Take 1 Tablet by mouth Every morning.  levothyroxine (SYNTHROID) 125 mcg tablet TAKE 1 TABLET BY MOUTH EVERY DAY BEFORE BREAKFAST    acetaminophen (Tylenol Extra Strength) 500 mg tablet Take 2 Tabs by mouth every eight (8) hours as needed for Pain. Indications: pain    FLUoxetine (PROzac) 10 mg tablet Take 10 mg by mouth Every morning. Only takes on week before and week of period (Patient not taking: Reported on 3/6/4886)    FOLIC ACID PO Take 548 mcg by mouth daily.  Lactobacillus acidophilus (PROBIOTIC PO) Take 1 Tablet by mouth daily. 50 billion      No current facility-administered medications for this visit. Allergies: (reviewed)  No Known Allergies       OBJECTIVE:    Physical Exam:  VITAL SIGNS: Vitals:    07/05/22 1058   BP: 94/65   Pulse: 91   Weight: 117 lb (53.1 kg)   Height: 4' 11\" (1.499 m)     Body mass index is 23.63 kg/m².    GENERAL KORI: Conversant, alert, oriented. No acute distress. HEENT: HEENT. No thyroid enlargement. No JVD. Neck: Supple without restrictions. RESPIRATORY: Clear to auscultation and percussion to the bases. No CVAT. CARDIOVASC: RRR without murmur/rub. GASTROINT: soft, non-tender, without masses or organomegaly   MUSCULOSKEL: no joint tenderness, deformity or swelling       EXTREMITIES: extremities normal, atraumatic, no cyanosis or edema   PELVIC: Deferred given recent LEEP as the cervix will not be representative of her normal exam.    RECTAL:    ALISHA SURVEY: No suspicious lymphadenopathy or edema noted. NEURO: Grossly intact. No acute deficit. Lab Date as available:    Lab Results   Component Value Date/Time    WBC 5.2 03/22/2022 08:40 AM    HGB 12.3 03/22/2022 08:40 AM    HCT 39.7 03/22/2022 08:40 AM    PLATELET 044 51/58/2585 08:40 AM    MCV 93.0 03/22/2022 08:40 AM    Hgb, External 10.7 02/18/2015 12:00 AM    Hct, External 32.8 02/18/2015 12:00 AM    Platelet cnt., External 273 02/24/2015 12:00 AM     Lab Results   Component Value Date/Time    Sodium 138 03/22/2022 08:40 AM    Potassium 4.2 03/22/2022 08:40 AM    Chloride 108 03/22/2022 08:40 AM    CO2 26 03/22/2022 08:40 AM    Anion gap 4 (L) 03/22/2022 08:40 AM    Glucose 76 03/22/2022 08:40 AM    BUN 10 03/22/2022 08:40 AM    Creatinine 0.70 03/22/2022 08:40 AM    BUN/Creatinine ratio 14 03/22/2022 08:40 AM    GFR est AA >60 03/22/2022 08:40 AM    GFR est non-AA >60 03/22/2022 08:40 AM    Calcium 8.9 03/22/2022 08:40 AM         IMPRESSION/PLAN:    Ms. Wil Mercedes is a 28 y.o. female with a working diagnosis of superficially invasive SCC of the cervix, depth <1mm    Problems:     Patient Active Problem List    Diagnosis Date Noted    Malignant neoplasm of exocervix ([address] 07/06/2022    Acquired hypothyroidism 03/22/2022    Pregnancy 06/26/2015       I reviewed Ms. Wil Mercedes course to date, including her medical records, recent studies, physical exam, and review of symptoms. I discussed the natural history of cervical dysplasia and cervical cancer, including the role of HPV. Her LEEP was very recent so and exam was not performed today. The patient would also like to maintain her fertility if possible, so our conversation included options to this effect. I discussed that based on her LEEP specimen with <1mm invasion and negative margins, that it is likely she will have a Stage Ia1 SCC of the cervix. I discussed that standard of care would be to proceed with a CKC to rule out other invasive disease. Given she has a Stage Ia1, a CKC would be adequate treatment for women who desire to maintain their fertility. If she is done with childbearing then the standard of care would be for a hysterectomy. Given recent LEEP, will have patient RTC in a few weeks for a more accurate exam of her cervix. At that time will set the patient up for a CKC. If her CKC margins are negative, and given that the patient would have a Stage Ia1 SCC of the cervix and desires to maintain her fertility, then it is reasonable and appropriate to continue with observation at that point. All questions and concerns were addressed with the patient and she is comfortable with the plan.        Defined Sensitive Document    >50% of total time allocated to visit dedicated to counseling, 60 minutes total.    Signed By: Bee Carranza MD     7/6/2022/7:20 AM

## 2022-07-05 NOTE — LETTER
7/6/2022    Patient: Erlin King   YOB: 1995   Date of Visit: 7/5/2022     Rowena Flores MD  [address] [facility] [address]  [address] Hossein Mi MD  [address] [facility] [address] [facility]  [address] [place]    Dear MD Jaquelin Casper MD,      Thank you for referring Ms. Erlin King to [address] [place] [address] for evaluation. My notes for this consultation are attached. If you have questions, please do not hesitate to call me. I look forward to following your patient along with you.       Sincerely,    Saranya Hoskins MD

## 2022-07-06 PROBLEM — C53.1 MALIGNANT NEOPLASM OF EXOCERVIX (HCC): Status: ACTIVE | Noted: 2022-07-06

## 2022-07-18 NOTE — PROGRESS NOTES
Two week follow up post LEEP on 6/16/22 ,  pt reports no abnormal spotting or bleeding, pt states no questions or concerns for today's visit    1. Have you been to the ER, urgent care clinic since your last visit? Hospitalized since your last visit?  no    2. Have you seen or consulted any other health care providers outside of the [address] [place] [address] since your last visit? Include any pap smears or colon screening.    no

## 2022-07-18 NOTE — PROGRESS NOTES
[address] [place] [address] Mathias Moritz [address], 0[address] [facility]  P [phone]  F [phone]    Office Note  Patient ID:  Name:  Pita Crenshaw  MRN:  [id number]  :  1995/27 y.o. Date:  2022      HISTORY OF PRESENT ILLNESS:  Ms. Pita Crenshaw is a 28 y.o. premenopausal female who presents as a new patient from Dr. 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 LEEP on 2022 secondary to prior HSIL pap smear demonstrated \"superficially invasive squamous cell carcinoma involving transformation zone with high-grade squamous intraepithelial lesion with endocervical glandular extension, depth <1mm\". She was subsequently referred to our office for further discussion and management. The patient has two children; and she would like to maintain her fertility if possible. Pathology Review:   2022:  * * *FINAL PATHOLOGIC DIAGNOSIS* * *   <<<<<  1. Uterus, cervix, excision:        Superficially invasive squamous cell carcinoma involving   transformation zone with high-grade squamous intraepithelial lesion with   endocervical glandular extension   Depth of invasion: <1 mm   Lymph node vascular invasion: None identified   Margins: All margins uninvolved by invasive carcinoma or high-grade   dysplasia   Distance from invasive carcinoma to closest margin: <2 mm to deep and   ectocervical margins   2. Uterus, cervix, submitted as endocervical, excision:        Squamous epithelium negative for dysplasia   Benign endocervical mucosa   >>>>>  * * *Comment* * *   <<<<<  The histologic sections have transformation zone with high-grade dysplasia   with extensive endocervical glandular extension including a single gland   with few nests of superficially invasive tumor cells. The findings have   been discussed with Dr. Jamie Meneses at 900 hours on 2022.      ROS:  A comprehensive review of systems was negative except for that written in the History of Present Illness. , 10 point ROS      ECOG stGstrstastdstest:st st1st Problem List:  Patient Active Problem List    Diagnosis Date Noted    Malignant neoplasm of exocervix ([address] 07/06/2022    Acquired hypothyroidism 03/22/2022    Pregnancy 06/26/2015     PMH:  Past Medical History:   Diagnosis Date    Acquired hypothyroidism 3/22/2022    Anemia     childhood, resolved    GERD (gastroesophageal reflux disease)     Gonorrhea     hx of     PONV (postoperative nausea and vomiting)       PSH:  Past Surgical History:   Procedure Laterality Date    HX CHOLECYSTECTOMY  2020    HX LEEP PROCEDURE  06/16/2022    Dr. Nithya Casper OTHER SURGICAL  2021    tummy iris      Social History:  Social History     Tobacco Use    Smoking status: Never    Smokeless tobacco: Never    Tobacco comments:     social, approx 1 per week   Substance Use Topics    Alcohol use: Yes     Comment: socially       Family History:  History reviewed. No pertinent family history. Medications: (reviewed)  Current Outpatient Medications   Medication Sig    ESTRADIOL PO Take 1 Tablet by mouth Every morning. levothyroxine (SYNTHROID) 125 mcg tablet TAKE 1 TABLET BY MOUTH EVERY DAY BEFORE BREAKFAST    acetaminophen (Tylenol Extra Strength) 500 mg tablet Take 2 Tabs by mouth every eight (8) hours as needed for Pain. Indications: pain    FOLIC ACID PO Take 316 mcg by mouth daily. Lactobacillus acidophilus (PROBIOTIC PO) Take 1 Tablet by mouth daily. 50 billion     FLUoxetine (PROzac) 10 mg tablet Take 10 mg by mouth Every morning. Only takes on week before and week of period (Patient not taking: Reported on 7/5/2022)     No current facility-administered medications for this visit. Allergies: (reviewed)  No Known Allergies       OBJECTIVE:    Physical Exam:  VITAL SIGNS: Vitals:    07/19/22 1050   BP: 95/70   Pulse: 90   Weight: 111 lb 12.8 oz (50.7 kg)   Height: 4' 11\" (1.499 m)     Body mass index is 22.58 kg/m².    GENERAL KORI: Conversant, alert, oriented. No acute distress. HEENT: HEENT. No thyroid enlargement. No JVD. Neck: Supple without restrictions. RESPIRATORY: Clear to auscultation and percussion to the bases. No CVAT. CARDIOVASC: RRR without murmur/rub. GASTROINT: soft, non-tender, without masses or organomegaly   MUSCULOSKEL: no joint tenderness, deformity or swelling       EXTREMITIES: extremities normal, atraumatic, no cyanosis or edema   PELVIC: exam chaperoned by nurse. Normal appearing external genitalia. On speculum exam, the vagina and cervix are actually normal appearing. On bimanual the cervix and uterus are normal and mobile. No evidence of masses or nodularity on bimanual exam. Deferred rectovaginal exam.      RECTAL:    ALISHA SURVEY: No suspicious lymphadenopathy or edema noted. NEURO: Grossly intact. No acute deficit.        Lab Date as available:    Lab Results   Component Value Date/Time    WBC 5.2 03/22/2022 08:40 AM    HGB 12.3 03/22/2022 08:40 AM    HCT 39.7 03/22/2022 08:40 AM    PLATELET 138 15/56/1195 08:40 AM    MCV 93.0 03/22/2022 08:40 AM    Hgb, External 10.7 02/18/2015 12:00 AM    Hct, External 32.8 02/18/2015 12:00 AM    Platelet cnt., External 273 02/24/2015 12:00 AM     Lab Results   Component Value Date/Time    Sodium 138 03/22/2022 08:40 AM    Potassium 4.2 03/22/2022 08:40 AM    Chloride 108 03/22/2022 08:40 AM    CO2 26 03/22/2022 08:40 AM    Anion gap 4 (L) 03/22/2022 08:40 AM    Glucose 76 03/22/2022 08:40 AM    BUN 10 03/22/2022 08:40 AM    Creatinine 0.70 03/22/2022 08:40 AM    BUN/Creatinine ratio 14 03/22/2022 08:40 AM    GFR est AA >60 03/22/2022 08:40 AM    GFR est non-AA >60 03/22/2022 08:40 AM    Calcium 8.9 03/22/2022 08:40 AM         IMPRESSION/PLAN:    Ms. Jim Castro is a 28 y.o. female with a working diagnosis of superficially invasive SCC of the cervix, depth <1mm    Problems:     Patient Active Problem List    Diagnosis Date Noted    Malignant neoplasm of [address]ix ([facility] [address] 07/06/2022    Acquired hypothyroidism 03/22/2022    Pregnancy 06/26/2015     Reviewed patient's course to date, including her normal exam today. I have discussed that based on her exam and her superficially invasive SCC consistent with Stage 1a SCC of the cervix that the standard of care would be to proceed with a CKC to rule out other invasive disease. Given she has a Stage Ia1, a CKC would be adequate treatment for women who desire to maintain their fertility; and she does desire to maintain her fertility. If she is done with childbearing then the standard of care would be for a hysterectomy. Posted for CKC/ECC on 8/12/2022. If her CKC margins are negative, and given that the patient would have a Stage Ia1 SCC of the cervix and desires to maintain her fertility, then it is reasonable and appropriate to continue with observation at that point. All questions and concerns were addressed with the patient and she is comfortable with the plan. An electronic signature was used to authenticate this note.      Stephenie Pires MD

## 2022-07-18 NOTE — H&P (VIEW-ONLY)
[address] [place] [address] Mathias Moritz [address], [address] [facility]  P [phone]  F [phone]    Office Note  Patient ID:  Name:  Fabiola Barragan  MRN:  [id number]  :  1995/27 y.o. Date:  2022      HISTORY OF PRESENT ILLNESS:  Ms. Fabiola Barragan is a 28 y.o. premenopausal female who presents as a new patient from Dr. 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 LEEP on 2022 secondary to prior HSIL pap smear demonstrated \"superficially invasive squamous cell carcinoma involving transformation zone with high-grade squamous intraepithelial lesion with endocervical glandular extension, depth <1mm\". She was subsequently referred to our office for further discussion and management. The patient has two children; and she would like to maintain her fertility if possible. Pathology Review:   2022:  * * *FINAL PATHOLOGIC DIAGNOSIS* * *   <<<<<  1. Uterus, cervix, excision:        Superficially invasive squamous cell carcinoma involving   transformation zone with high-grade squamous intraepithelial lesion with   endocervical glandular extension   Depth of invasion: <1 mm   Lymph node vascular invasion: None identified   Margins: All margins uninvolved by invasive carcinoma or high-grade   dysplasia   Distance from invasive carcinoma to closest margin: <2 mm to deep and   ectocervical margins   2. Uterus, cervix, submitted as endocervical, excision:        Squamous epithelium negative for dysplasia   Benign endocervical mucosa   >>>>>  * * *Comment* * *   <<<<<  The histologic sections have transformation zone with high-grade dysplasia   with extensive endocervical glandular extension including a single gland   with few nests of superficially invasive tumor cells. The findings have   been discussed with Dr. Moises Wu at 900 hours on 2022.      ROS:  A comprehensive review of systems was negative except for that written in the History of Present Illness. , 10 point ROS      ECOG stGstrstastdstest:st st1st Problem List:  Patient Active Problem List    Diagnosis Date Noted    Malignant neoplasm of exocervix ([address] 07/06/2022    Acquired hypothyroidism 03/22/2022    Pregnancy 06/26/2015     PMH:  Past Medical History:   Diagnosis Date    Acquired hypothyroidism 3/22/2022    Anemia     childhood, resolved    GERD (gastroesophageal reflux disease)     Gonorrhea     hx of     PONV (postoperative nausea and vomiting)       PSH:  Past Surgical History:   Procedure Laterality Date    HX CHOLECYSTECTOMY  2020    HX LEEP PROCEDURE  06/16/2022    Dr. Kyle Murray OTHER SURGICAL  2021    tummy harman      Social History:  Social History     Tobacco Use    Smoking status: Never    Smokeless tobacco: Never    Tobacco comments:     social, approx 1 per week   Substance Use Topics    Alcohol use: Yes     Comment: socially       Family History:  History reviewed. No pertinent family history. Medications: (reviewed)  Current Outpatient Medications   Medication Sig    ESTRADIOL PO Take 1 Tablet by mouth Every morning. levothyroxine (SYNTHROID) 125 mcg tablet TAKE 1 TABLET BY MOUTH EVERY DAY BEFORE BREAKFAST    acetaminophen (Tylenol Extra Strength) 500 mg tablet Take 2 Tabs by mouth every eight (8) hours as needed for Pain. Indications: pain    FOLIC ACID PO Take 700 mcg by mouth daily. Lactobacillus acidophilus (PROBIOTIC PO) Take 1 Tablet by mouth daily. 50 billion     FLUoxetine (PROzac) 10 mg tablet Take 10 mg by mouth Every morning. Only takes on week before and week of period (Patient not taking: Reported on 7/5/2022)     No current facility-administered medications for this visit. Allergies: (reviewed)  No Known Allergies       OBJECTIVE:    Physical Exam:  VITAL SIGNS: Vitals:    07/19/22 1050   BP: 95/70   Pulse: 90   Weight: 111 lb 12.8 oz (50.7 kg)   Height: 4' 11\" (1.499 m)     Body mass index is 22.58 kg/m².    GENERAL KORI: Conversant, alert, oriented. No acute distress. HEENT: HEENT. No thyroid enlargement. No JVD. Neck: Supple without restrictions. RESPIRATORY: Clear to auscultation and percussion to the bases. No CVAT. CARDIOVASC: RRR without murmur/rub. GASTROINT: soft, non-tender, without masses or organomegaly   MUSCULOSKEL: no joint tenderness, deformity or swelling       EXTREMITIES: extremities normal, atraumatic, no cyanosis or edema   PELVIC: exam chaperoned by nurse. Normal appearing external genitalia. On speculum exam, the vagina and cervix are actually normal appearing. On bimanual the cervix and uterus are normal and mobile. No evidence of masses or nodularity on bimanual exam. Deferred rectovaginal exam.      RECTAL:    ALISHA SURVEY: No suspicious lymphadenopathy or edema noted. NEURO: Grossly intact. No acute deficit.        Lab Date as available:    Lab Results   Component Value Date/Time    WBC 5.2 03/22/2022 08:40 AM    HGB 12.3 03/22/2022 08:40 AM    HCT 39.7 03/22/2022 08:40 AM    PLATELET 354 68/77/4560 08:40 AM    MCV 93.0 03/22/2022 08:40 AM    Hgb, External 10.7 02/18/2015 12:00 AM    Hct, External 32.8 02/18/2015 12:00 AM    Platelet cnt., External 273 02/24/2015 12:00 AM     Lab Results   Component Value Date/Time    Sodium 138 03/22/2022 08:40 AM    Potassium 4.2 03/22/2022 08:40 AM    Chloride 108 03/22/2022 08:40 AM    CO2 26 03/22/2022 08:40 AM    Anion gap 4 (L) 03/22/2022 08:40 AM    Glucose 76 03/22/2022 08:40 AM    BUN 10 03/22/2022 08:40 AM    Creatinine 0.70 03/22/2022 08:40 AM    BUN/Creatinine ratio 14 03/22/2022 08:40 AM    GFR est AA >60 03/22/2022 08:40 AM    GFR est non-AA >60 03/22/2022 08:40 AM    Calcium 8.9 03/22/2022 08:40 AM         IMPRESSION/PLAN:    Ms. Greg Marie is a 28 y.o. female with a working diagnosis of superficially invasive SCC of the cervix, depth <1mm    Problems:     Patient Active Problem List    Diagnosis Date Noted    Malignant ne[address] of [address]ix [address] [address] 07/06/2022    Acquired hypothyroidism 03/22/2022    Pregnancy 06/26/2015     Reviewed patient's course to date, including her normal exam today. I have discussed that based on her exam and her superficially invasive SCC consistent with Stage 1a SCC of the cervix that the standard of care would be to proceed with a CKC to rule out other invasive disease. Given she has a Stage Ia1, a CKC would be adequate treatment for women who desire to maintain their fertility; and she does desire to maintain her fertility. If she is done with childbearing then the standard of care would be for a hysterectomy. Posted for CKC/ECC on 8/12/2022. If her CKC margins are negative, and given that the patient would have a Stage Ia1 SCC of the cervix and desires to maintain her fertility, then it is reasonable and appropriate to continue with observation at that point. All questions and concerns were addressed with the patient and she is comfortable with the plan. An electronic signature was used to authenticate this note.      Mainor Ovalles MD

## 2022-07-19 ENCOUNTER — OFFICE VISIT (OUTPATIENT)
Dept: GYNECOLOGY | Age: 27
End: 2022-07-19
Payer: COMMERCIAL

## 2022-07-19 VITALS
HEART RATE: 90 BPM | WEIGHT: 111.8 LBS | HEIGHT: 59 IN | BODY MASS INDEX: 22.54 KG/M2 | DIASTOLIC BLOOD PRESSURE: 70 MMHG | SYSTOLIC BLOOD PRESSURE: 95 MMHG

## 2022-07-19 DIAGNOSIS — C53.1 MALIGNANT NEOPLASM OF EXOCERVIX (HCC): Primary | ICD-10-CM

## 2022-07-19 PROCEDURE — 99214 OFFICE O/P EST MOD 30 MIN: CPT | Performed by: OBSTETRICS & GYNECOLOGY

## 2022-08-05 ENCOUNTER — HOSPITAL ENCOUNTER (OUTPATIENT)
Dept: PREADMISSION TESTING | Age: 27
Discharge: HOME | End: 2022-08-05
Payer: COMMERCIAL

## 2022-08-05 VITALS
TEMPERATURE: 98.2 F | WEIGHT: 110.45 LBS | HEIGHT: 58 IN | SYSTOLIC BLOOD PRESSURE: 97 MMHG | RESPIRATION RATE: 12 BRPM | HEART RATE: 82 BPM | BODY MASS INDEX: 23.18 KG/M2 | DIASTOLIC BLOOD PRESSURE: 63 MMHG

## 2022-08-05 LAB
ALBUMIN SERPL-MCNC: 3.7 G/DL (ref 3.5–5)
ALBUMIN/GLOB SERPL: 1.1 {RATIO} (ref 1.1–2.2)
ALP SERPL-CCNC: 59 U/L (ref 45–117)
ALT SERPL-CCNC: 71 U/L (ref 12–78)
ANION GAP SERPL CALC-SCNC: 6 MMOL/L (ref 5–15)
AST SERPL-CCNC: 27 U/L (ref 15–37)
BASOPHILS # BLD: 0 K/UL (ref 0–0.1)
BASOPHILS NFR BLD: 0 % (ref 0–1)
BILIRUB SERPL-MCNC: 1.1 MG/DL (ref 0.2–1)
BUN SERPL-MCNC: 10 MG/DL (ref 6–20)
BUN/CREAT SERPL: 12 (ref 12–20)
CALCIUM SERPL-MCNC: 9.3 MG/DL (ref 8.5–10.1)
CHLORIDE SERPL-SCNC: 107 MMOL/L (ref 97–108)
CO2 SERPL-SCNC: 25 MMOL/L (ref 21–32)
CREAT SERPL-MCNC: 0.81 MG/DL (ref 0.55–1.02)
DIFFERENTIAL METHOD BLD: NORMAL
EOSINOPHIL # BLD: 0.1 K/UL (ref 0–0.4)
EOSINOPHIL NFR BLD: 2 % (ref 0–7)
ERYTHROCYTE [DISTWIDTH] IN BLOOD BY AUTOMATED COUNT: 13.8 % (ref 11.5–14.5)
GLOBULIN SER CALC-MCNC: 3.3 G/DL (ref 2–4)
GLUCOSE SERPL-MCNC: 95 MG/DL (ref 65–100)
HCT VFR BLD AUTO: 39.2 % (ref 35–47)
HGB BLD-MCNC: 12.7 G/DL (ref 11.5–16)
IMM GRANULOCYTES # BLD AUTO: 0 K/UL (ref 0–0.04)
IMM GRANULOCYTES NFR BLD AUTO: 0 % (ref 0–0.5)
LYMPHOCYTES # BLD: 1.1 K/UL (ref 0.8–3.5)
LYMPHOCYTES NFR BLD: 23 % (ref 12–49)
MCH RBC QN AUTO: 29.1 PG (ref 26–34)
MCHC RBC AUTO-ENTMCNC: 32.4 G/DL (ref 30–36.5)
MCV RBC AUTO: 89.9 FL (ref 80–99)
MONOCYTES # BLD: 0.5 K/UL (ref 0–1)
MONOCYTES NFR BLD: 11 % (ref 5–13)
NEUTS SEG # BLD: 3 K/UL (ref 1.8–8)
NEUTS SEG NFR BLD: 64 % (ref 32–75)
NRBC # BLD: 0 K/UL (ref 0–0.01)
NRBC BLD-RTO: 0 PER 100 WBC
PLATELET # BLD AUTO: 224 K/UL (ref 150–400)
PMV BLD AUTO: 11.2 FL (ref 8.9–12.9)
POTASSIUM SERPL-SCNC: 3.9 MMOL/L (ref 3.5–5.1)
PROT SERPL-MCNC: 7 G/DL (ref 6.4–8.2)
RBC # BLD AUTO: 4.36 M/UL (ref 3.8–5.2)
SODIUM SERPL-SCNC: 138 MMOL/L (ref 136–145)
WBC # BLD AUTO: 4.8 K/UL (ref 3.6–11)

## 2022-08-05 PROCEDURE — 85025 COMPLETE CBC W/AUTO DIFF WBC: CPT

## 2022-08-05 PROCEDURE — 36415 COLL VENOUS BLD VENIPUNCTURE: CPT

## 2022-08-05 PROCEDURE — 80053 COMPREHEN METABOLIC PANEL: CPT

## 2022-08-05 RX ORDER — LEVONORGESTREL AND ETHINYL ESTRADIOL 0.1-0.02MG
1 KIT ORAL
COMMUNITY

## 2022-08-05 NOTE — PERIOP NOTES
Preoperative instructions reviewed with patient. Patient given SSI infection FAQS sheet. Given two bottles of skin prep chlorhexidine soap; given written and verbal instructions on use. Patient was given the opportunity to ask questions on the information provided. Surgical consent obtained and signed by patient. Patient did not have COVID vaccination card today; instructed to bring day of surgery.

## 2022-08-05 NOTE — PERIOP NOTES
[facility]  PREOPERATIVE INSTRUCTIONS    Surgery Date:   8/12/22    Your surgeon's office or [facility] staff will call you between 4 PM- 8 PM the day before surgery with your arrival time. If your surgery is on a Monday, you will receive a call the preceding Friday. Please report to [facility] Patient Access/Admitting on the 1st floor. Bring your insurance card, photo identification, and any copayment ( if applicable). If you are going home the same day of your surgery, you must have a responsible adult to drive you home. You need to have a responsible adult to stay with you the first 24 hours after surgery and you should not drive a car for 24 hours following your surgery. Nothing to eat or drink after midnight the night before surgery. This includes no water, gum, mints, coffee, juice, etc.  Please note special instructions, if applicable, below for medications. Do NOT drink alcohol or smoke 24 hours before surgery. STOP smoking for 14 days prior as it helps with breathing and healing after surgery. If you are being admitted to the hospital, please leave personal belongings/luggage in your car until you have an assigned hospital room number. Please wear comfortable clothes. Wear your glasses instead of contacts. We ask that all money, jewelry and valuables be left at home. Wear no make up, particularly mascara, the day of surgery. All body piercings, rings, and jewelry need to be removed and left at home. Please remove any nail polish or artificial nails from your fingernails. Please wear your hair loose or down. Please no pony-tails, buns, or any metal hair accessories. If you shower the morning of surgery, please do not apply any lotions or powders afterwards. You may wear deodorant, unless having breast surgery. Do not shave any body area within 24 hours of your surgery. Please follow all instructions to avoid any potential surgical cancellation.   Should your physical condition change, (i.e. fever, cold, flu, etc.) please notify your surgeon as soon as possible. It is important to be on time. If a situation occurs where you may be delayed, please call:  [phone] / [phone] on the day of surgery. The Preadmission Testing staff can be reached at [phone]. Special instructions: NONE      Current Outpatient Medications   Medication Sig    levonorgestrel-ethinyl estradiol (AVIANE, ALESSE, LESSINA) 0.1-20 mg-mcg tab Take 1 Tablet by mouth. FLUoxetine (PROzac) 10 mg tablet Take 10 mg by mouth daily (after lunch). Only takes on week before and week of period    levothyroxine (SYNTHROID) 125 mcg tablet TAKE 1 TABLET BY MOUTH EVERY DAY BEFORE BREAKFAST (Patient taking differently: Take 125 mcg by mouth Daily (before breakfast). )    acetaminophen (Tylenol Extra Strength) 500 mg tablet Take 2 Tabs by mouth every eight (8) hours as needed for Pain. Indications: pain     No current facility-administered medications for this encounter. YOU MUST ONLY TAKE THESE MEDICATIONS THE MORNING OF SURGERY WITH A SIP OF WATER: PROZAC, LEVOTHYROXINE  MEDICATIONS TO TAKE THE MORNING OF SURGERY ONLY IF NEEDED: TYLENOL  HOLD these prescription medications BEFORE Surgery: N/A  Ask your surgeon/prescribing physician about when/if to STOP taking these medications: BIRTH CONTROL PILLS  Stop all vitamins, herbal medicines and Aspirin containing products 7 days prior to surgery. Stop any non-steroidal anti-inflammatory drugs (i.e. Ibuprofen, Naproxen, Advil, Aleve) 3 days before surgery. You may take Tylenol. If you are currently taking Plavix, Coumadin, or any other blood-thinning/anticoagulant medication contact your prescribing physician for instructions. Preventing Infections Before and After - Your Surgery    IMPORTANT INSTRUCTIONS      You play an important role in your health and preparation for surgery.  To reduce the germs on your skin you will need to shower with CHG soap (Chorhexidine gluconate 4%) two times before surgery. CHG soap (Hibiclens, Hex-A-Clens or store brand)  CHG soap will be provided at your Preadmission Testing (PAT) appointment. If you do not have a PAT appointment before surgery, you may arrange to  CHG soap from our office or purchase CHG soap at a pharmacy, grocery or department store. You need to purchase TWO 4 ounce bottles to use for your 2 showers. Steps to follow:  [facility] your hair with your normal shampoo and your body with regular soap and rinse well to remove shampoo and soap from your skin. Wet a clean washcloth and turn off the shower. Put CHG soap on washcloth and apply to your entire body from the neck down. Do not use on your head, face or private parts(genitals). Do not use CHG soap on open sores, wounds or areas of skin irritation. Wash you body gently for 5 minutes. Do not wash your skin too hard. This soap does not create lather. Pay special attention to your underarms and from your belly button to your feet. Turn the shower back on and rinse well to get CHG soap off your body. Pat your skin dry with a clean, dry towel. Do not apply lotions or moisturizer. Put on clean clothes and sleep on fresh bed sheets and do not allow pets to sleep with you. Shower with CHG soap 2 times before your surgery  The evening before your surgery  The morning of your surgery      Tips to help prevent infections after your surgery:  Protect your surgical wound from germs:  Hand washing is the most important thing you and your caregivers can do to prevent infections. Keep your bandage clean and dry! Do not touch your surgical wound. Use clean, freshly washed towels and washcloths every time you shower; do not share bath linens with others. Until your surgical wound is healed, wear clothing and sleep on bed linens each day that are clean and freshly washed. Do not allow pets to sleep in your bed with you or touch your surgical wound.   Do not smoke - smoking delays wound healing. This may be a good time to stop smoking. If you have diabetes, it is important for you to manage your blood sugar levels properly before your surgery as well as after your surgery. Poorly managed blood sugar levels slow down wound healing and prevent you from healing completely. Patient Information Regarding COVID Restrictions    Day of Procedure    Please park in the parking deck or any designated visitor parking lot. Enter the facility through the Main Entrance of the hospital.  On the day of surgery, please provide the cell phone number of the person who will be waiting for you to the Patient Access representative at the time of registration. Please wear a mask on the day of your procedure. We are now allowing two designated visitors per stay. Pediatric patients may have 2 designated visitors. These two people may come in with you on the day of your procedure. The designated visitor must also wear a mask. Once your procedure and the immediate recovery period is completed, a nurse in the recovery area will contact your designated visitor to inform them of your room number or to otherwise review other pertinent information regarding your care. Social distancing practices are to be adhered to in waiting areas and the cafeteria. The patient was contacted  in person. She verbalized understanding of all instructions does not  need reinforcement.

## 2022-08-11 ENCOUNTER — ANESTHESIA EVENT (OUTPATIENT)
Dept: SURGERY | Age: 27
End: 2022-08-11
Payer: COMMERCIAL

## 2022-08-12 ENCOUNTER — HOSPITAL ENCOUNTER (OUTPATIENT)
Age: 27
Setting detail: OUTPATIENT SURGERY
Discharge: HOME | End: 2022-08-12
Attending: OBSTETRICS & GYNECOLOGY | Admitting: OBSTETRICS & GYNECOLOGY
Payer: COMMERCIAL

## 2022-08-12 ENCOUNTER — ANESTHESIA (OUTPATIENT)
Dept: SURGERY | Age: 27
End: 2022-08-12
Payer: COMMERCIAL

## 2022-08-12 VITALS
HEIGHT: 58 IN | RESPIRATION RATE: 24 BRPM | OXYGEN SATURATION: 97 % | DIASTOLIC BLOOD PRESSURE: 69 MMHG | SYSTOLIC BLOOD PRESSURE: 101 MMHG | HEART RATE: 102 BPM | TEMPERATURE: 96.8 F | BODY MASS INDEX: 23.18 KG/M2 | WEIGHT: 110.45 LBS

## 2022-08-12 DIAGNOSIS — G89.18 POSTOPERATIVE PAIN: ICD-10-CM

## 2022-08-12 DIAGNOSIS — C53.1 MALIGNANT NEOPLASM OF EXOCERVIX (HCC): Primary | ICD-10-CM

## 2022-08-12 LAB — HCG UR QL: NEGATIVE

## 2022-08-12 PROCEDURE — 74011250636 HC RX REV CODE- 250/636: Performed by: OBSTETRICS & GYNECOLOGY

## 2022-08-12 PROCEDURE — 74011250636 HC RX REV CODE- 250/636: Performed by: STUDENT IN AN ORGANIZED HEALTH CARE EDUCATION/TRAINING PROGRAM

## 2022-08-12 PROCEDURE — 77030018390 HC SPNG HEMSTAT2 J&J -B: Performed by: OBSTETRICS & GYNECOLOGY

## 2022-08-12 PROCEDURE — 88307 TISSUE EXAM BY PATHOLOGIST: CPT

## 2022-08-12 PROCEDURE — 74011250637 HC RX REV CODE- 250/637: Performed by: OBSTETRICS & GYNECOLOGY

## 2022-08-12 PROCEDURE — 76210000017 HC OR PH I REC 1.5 TO 2 HR: Performed by: OBSTETRICS & GYNECOLOGY

## 2022-08-12 PROCEDURE — 74011000250 HC RX REV CODE- 250: Performed by: OBSTETRICS & GYNECOLOGY

## 2022-08-12 PROCEDURE — 76060000033 HC ANESTHESIA 1 TO 1.5 HR: Performed by: OBSTETRICS & GYNECOLOGY

## 2022-08-12 PROCEDURE — 81025 URINE PREGNANCY TEST: CPT

## 2022-08-12 PROCEDURE — 74011250637 HC RX REV CODE- 250/637: Performed by: STUDENT IN AN ORGANIZED HEALTH CARE EDUCATION/TRAINING PROGRAM

## 2022-08-12 PROCEDURE — 76010000138 HC OR TIME 0.5 TO 1 HR: Performed by: OBSTETRICS & GYNECOLOGY

## 2022-08-12 PROCEDURE — 77030042556 HC PNCL CAUT -B: Performed by: OBSTETRICS & GYNECOLOGY

## 2022-08-12 PROCEDURE — 77030019905 HC CATH URETH INTMIT MDII -A: Performed by: OBSTETRICS & GYNECOLOGY

## 2022-08-12 PROCEDURE — 74011000250 HC RX REV CODE- 250: Performed by: NURSE ANESTHETIST, CERTIFIED REGISTERED

## 2022-08-12 PROCEDURE — 74011250636 HC RX REV CODE- 250/636: Performed by: NURSE ANESTHETIST, CERTIFIED REGISTERED

## 2022-08-12 PROCEDURE — 77030040922 HC BLNKT HYPOTHRM STRY -A

## 2022-08-12 PROCEDURE — 88305 TISSUE EXAM BY PATHOLOGIST: CPT

## 2022-08-12 PROCEDURE — 77030040361 HC SLV COMPR DVT MDII -B: Performed by: OBSTETRICS & GYNECOLOGY

## 2022-08-12 PROCEDURE — 2709999900 HC NON-CHARGEABLE SUPPLY: Performed by: OBSTETRICS & GYNECOLOGY

## 2022-08-12 PROCEDURE — 77030031139 HC SUT VCRL2 J&J -A: Performed by: OBSTETRICS & GYNECOLOGY

## 2022-08-12 PROCEDURE — 77030010509 HC AIRWY LMA MSK TELE -A: Performed by: STUDENT IN AN ORGANIZED HEALTH CARE EDUCATION/TRAINING PROGRAM

## 2022-08-12 RX ORDER — LIDOCAINE HYDROCHLORIDE 20 MG/ML
INJECTION, SOLUTION EPIDURAL; INFILTRATION; INTRACAUDAL; PERINEURAL AS NEEDED
Status: DISCONTINUED | OUTPATIENT
Start: 2022-08-12 | End: 2022-08-12 | Stop reason: HOSPADM

## 2022-08-12 RX ORDER — DEXAMETHASONE SODIUM PHOSPHATE 4 MG/ML
INJECTION, SOLUTION INTRA-ARTICULAR; INTRALESIONAL; INTRAMUSCULAR; INTRAVENOUS; SOFT TISSUE AS NEEDED
Status: DISCONTINUED | OUTPATIENT
Start: 2022-08-12 | End: 2022-08-12 | Stop reason: HOSPADM

## 2022-08-12 RX ORDER — TRAMADOL HYDROCHLORIDE 50 MG/1
50 TABLET ORAL
Status: DISCONTINUED | OUTPATIENT
Start: 2022-08-12 | End: 2022-08-12 | Stop reason: HOSPADM

## 2022-08-12 RX ORDER — LIDOCAINE HYDROCHLORIDE 10 MG/ML
0.1 INJECTION, SOLUTION EPIDURAL; INFILTRATION; INTRACAUDAL; PERINEURAL AS NEEDED
Status: DISCONTINUED | OUTPATIENT
Start: 2022-08-12 | End: 2022-08-12 | Stop reason: HOSPADM

## 2022-08-12 RX ORDER — ONDANSETRON 2 MG/ML
4 INJECTION INTRAMUSCULAR; INTRAVENOUS AS NEEDED
Status: DISCONTINUED | OUTPATIENT
Start: 2022-08-12 | End: 2022-08-12 | Stop reason: HOSPADM

## 2022-08-12 RX ORDER — FENTANYL CITRATE 50 UG/ML
25 INJECTION, SOLUTION INTRAMUSCULAR; INTRAVENOUS
Status: DISCONTINUED | OUTPATIENT
Start: 2022-08-12 | End: 2022-08-12 | Stop reason: HOSPADM

## 2022-08-12 RX ORDER — SODIUM CHLORIDE 0.9 % (FLUSH) 0.9 %
5-40 SYRINGE (ML) INJECTION AS NEEDED
Status: DISCONTINUED | OUTPATIENT
Start: 2022-08-12 | End: 2022-08-12 | Stop reason: HOSPADM

## 2022-08-12 RX ORDER — SODIUM CHLORIDE 0.9 % (FLUSH) 0.9 %
5-40 SYRINGE (ML) INJECTION EVERY 8 HOURS
Status: DISCONTINUED | OUTPATIENT
Start: 2022-08-12 | End: 2022-08-12 | Stop reason: HOSPADM

## 2022-08-12 RX ORDER — FERRIC SUBSULFATE 20-22G/100
SOLUTION, NON-ORAL MISCELLANEOUS AS NEEDED
Status: DISCONTINUED | OUTPATIENT
Start: 2022-08-12 | End: 2022-08-12 | Stop reason: HOSPADM

## 2022-08-12 RX ORDER — TRAMADOL HYDROCHLORIDE 50 MG/1
50 TABLET ORAL
Qty: 20 TABLET | Refills: 0 | Status: SHIPPED | OUTPATIENT
Start: 2022-08-12 | End: 2022-08-19

## 2022-08-12 RX ORDER — ACETAMINOPHEN 325 MG/1
650 TABLET ORAL ONCE
Status: COMPLETED | OUTPATIENT
Start: 2022-08-12 | End: 2022-08-12

## 2022-08-12 RX ORDER — FENTANYL CITRATE 50 UG/ML
INJECTION, SOLUTION INTRAMUSCULAR; INTRAVENOUS AS NEEDED
Status: DISCONTINUED | OUTPATIENT
Start: 2022-08-12 | End: 2022-08-12 | Stop reason: HOSPADM

## 2022-08-12 RX ORDER — PHENYLEPHRINE HCL IN 0.9% NACL 0.4MG/10ML
SYRINGE (ML) INTRAVENOUS AS NEEDED
Status: DISCONTINUED | OUTPATIENT
Start: 2022-08-12 | End: 2022-08-12 | Stop reason: HOSPADM

## 2022-08-12 RX ORDER — HYDROMORPHONE HYDROCHLORIDE 1 MG/ML
0.2 INJECTION, SOLUTION INTRAMUSCULAR; INTRAVENOUS; SUBCUTANEOUS
Status: DISCONTINUED | OUTPATIENT
Start: 2022-08-12 | End: 2022-08-12 | Stop reason: HOSPADM

## 2022-08-12 RX ORDER — PROPOFOL 10 MG/ML
INJECTION, EMULSION INTRAVENOUS AS NEEDED
Status: DISCONTINUED | OUTPATIENT
Start: 2022-08-12 | End: 2022-08-12 | Stop reason: HOSPADM

## 2022-08-12 RX ORDER — SODIUM CHLORIDE, SODIUM LACTATE, POTASSIUM CHLORIDE, CALCIUM CHLORIDE 600; 310; 30; 20 MG/100ML; MG/100ML; MG/100ML; MG/100ML
INJECTION, SOLUTION INTRAVENOUS
Status: DISCONTINUED | OUTPATIENT
Start: 2022-08-12 | End: 2022-08-12 | Stop reason: HOSPADM

## 2022-08-12 RX ORDER — MIDAZOLAM HYDROCHLORIDE 1 MG/ML
INJECTION, SOLUTION INTRAMUSCULAR; INTRAVENOUS AS NEEDED
Status: DISCONTINUED | OUTPATIENT
Start: 2022-08-12 | End: 2022-08-12 | Stop reason: HOSPADM

## 2022-08-12 RX ORDER — SODIUM CHLORIDE, SODIUM LACTATE, POTASSIUM CHLORIDE, CALCIUM CHLORIDE 600; 310; 30; 20 MG/100ML; MG/100ML; MG/100ML; MG/100ML
50 INJECTION, SOLUTION INTRAVENOUS CONTINUOUS
Status: DISCONTINUED | OUTPATIENT
Start: 2022-08-12 | End: 2022-08-12 | Stop reason: HOSPADM

## 2022-08-12 RX ORDER — KETAMINE HCL IN 0.9 % NACL 50 MG/5 ML
SYRINGE (ML) INTRAVENOUS AS NEEDED
Status: DISCONTINUED | OUTPATIENT
Start: 2022-08-12 | End: 2022-08-12 | Stop reason: HOSPADM

## 2022-08-12 RX ORDER — ONDANSETRON 2 MG/ML
INJECTION INTRAMUSCULAR; INTRAVENOUS AS NEEDED
Status: DISCONTINUED | OUTPATIENT
Start: 2022-08-12 | End: 2022-08-12 | Stop reason: HOSPADM

## 2022-08-12 RX ORDER — EPHEDRINE SULFATE/0.9% NACL/PF 50 MG/5 ML
SYRINGE (ML) INTRAVENOUS AS NEEDED
Status: DISCONTINUED | OUTPATIENT
Start: 2022-08-12 | End: 2022-08-12 | Stop reason: HOSPADM

## 2022-08-12 RX ORDER — LIDOCAINE HYDROCHLORIDE AND EPINEPHRINE 10; 10 MG/ML; UG/ML
INJECTION, SOLUTION INFILTRATION; PERINEURAL AS NEEDED
Status: DISCONTINUED | OUTPATIENT
Start: 2022-08-12 | End: 2022-08-12 | Stop reason: HOSPADM

## 2022-08-12 RX ADMIN — SODIUM CHLORIDE, POTASSIUM CHLORIDE, SODIUM LACTATE AND CALCIUM CHLORIDE: 600; 310; 30; 20 INJECTION, SOLUTION INTRAVENOUS at 10:00

## 2022-08-12 RX ADMIN — ACETAMINOPHEN 650 MG: 325 TABLET ORAL at 08:58

## 2022-08-12 RX ADMIN — SODIUM CHLORIDE, POTASSIUM CHLORIDE, SODIUM LACTATE AND CALCIUM CHLORIDE 50 ML/HR: 600; 310; 30; 20 INJECTION, SOLUTION INTRAVENOUS at 08:55

## 2022-08-12 RX ADMIN — ONDANSETRON HYDROCHLORIDE 4 MG: 2 INJECTION, SOLUTION INTRAMUSCULAR; INTRAVENOUS at 10:37

## 2022-08-12 RX ADMIN — Medication 120 MCG: at 10:16

## 2022-08-12 RX ADMIN — ONDANSETRON HYDROCHLORIDE 4 MG: 2 SOLUTION INTRAMUSCULAR; INTRAVENOUS at 12:25

## 2022-08-12 RX ADMIN — TRAMADOL HYDROCHLORIDE 50 MG: 50 TABLET, COATED ORAL at 12:19

## 2022-08-12 RX ADMIN — LIDOCAINE HYDROCHLORIDE 60 MG: 20 INJECTION, SOLUTION EPIDURAL; INFILTRATION; INTRACAUDAL; PERINEURAL at 10:06

## 2022-08-12 RX ADMIN — PROPOFOL 100 MG: 10 INJECTION, EMULSION INTRAVENOUS at 10:06

## 2022-08-12 RX ADMIN — Medication 120 MCG: at 10:23

## 2022-08-12 RX ADMIN — MIDAZOLAM 2 MG: 1 INJECTION INTRAMUSCULAR; INTRAVENOUS at 10:01

## 2022-08-12 RX ADMIN — Medication 80 MCG: at 10:11

## 2022-08-12 RX ADMIN — Medication 10 MG: at 10:40

## 2022-08-12 RX ADMIN — Medication 10 MG: at 10:06

## 2022-08-12 RX ADMIN — Medication 80 MCG: at 10:29

## 2022-08-12 RX ADMIN — SODIUM CHLORIDE, POTASSIUM CHLORIDE, SODIUM LACTATE AND CALCIUM CHLORIDE 50 ML/HR: 600; 310; 30; 20 INJECTION, SOLUTION INTRAVENOUS at 11:22

## 2022-08-12 RX ADMIN — DEXAMETHASONE SODIUM PHOSPHATE 8 MG: 4 INJECTION, SOLUTION INTRAMUSCULAR; INTRAVENOUS at 10:17

## 2022-08-12 RX ADMIN — CEFOXITIN SODIUM 2 G: 2 POWDER, FOR SOLUTION INTRAVENOUS at 10:13

## 2022-08-12 RX ADMIN — Medication 10 MG: at 10:35

## 2022-08-12 RX ADMIN — FENTANYL CITRATE 25 MCG: 50 INJECTION, SOLUTION INTRAMUSCULAR; INTRAVENOUS at 10:06

## 2022-08-12 NOTE — ANESTHESIA POSTPROCEDURE EVALUATION
Post-Anesthesia Evaluation and Assessment    Patient: Agustin Robles MRN: [id number]  SSN: [id number]    YOB: 1995  Age: 28 y.o. Sex: female      I have evaluated the patient and they are stable and ready for discharge from the PACU. Cardiovascular Function/Vital Signs  Visit Vitals  BP 98/70   Pulse (!) 113   Temp 36 °C (96.8 °F)   Resp 19   Ht 4' 10\" (1.473 m)   Wt 50.1 kg (110 lb 7.2 oz)   SpO2 100%   BMI 23.08 kg/m²       Patient is status post General anesthesia for Procedure(s):  COLD KNIFE CONIZATION. Nausea/Vomiting: None    Postoperative hydration reviewed and adequate. Pain:  Pain Scale 1: Numeric (0 - 10) (08/12/22 1057)  Pain Intensity 1: 0 (08/12/22 1057)   Managed    Neurological Status:   Neuro (WDL): Within Defined Limits (08/12/22 1057)  Neuro  LUE Motor Response: Purposeful (08/12/22 1057)  LLE Motor Response: Purposeful (08/12/22 1057)  RUE Motor Response: Purposeful (08/12/22 1057)  RLE Motor Response: Purposeful (08/12/22 1057)   At baseline    Mental Status, Level of Consciousness: Alert and  oriented to person, place, and time    Pulmonary Status:   O2 Device: Nasal cannula (08/12/22 1130)   Adequate oxygenation and airway patent    Complications related to anesthesia: None    Post-anesthesia assessment completed. No concerns    Signed By: Apolinar Hannah DO     August 12, 2022                Procedure(s):  COLD KNIFE CONIZATION. general    <BSHSIANPOST>    INITIAL Post-op Vital signs:   Vitals Value Taken Time   BP 98/70 08/12/22 1145   Temp 36 °C (96.8 °F) 08/12/22 1105   Pulse 103 08/12/22 1156   Resp 16 08/12/22 1156   SpO2 98 % 08/12/22 1156   Vitals shown include unvalidated device data.

## 2022-08-12 NOTE — OP NOTES
Gynecologic Oncology Operative Report    Madelin Mayer  8/12/2022    Pre-operative dx:  1) Superficially-invasive cervical cancer    Post-operative dx:   1) Superficially-invasive cervical cancer    Procedure:   Cold knife conization of cervix,  Endocervical curettage    Surgeon:  Jessica Levy MD    Assistant:  n/a     Anesthesia:  LMA    EBL:  minimal    Complications:  none    Implants: none    Specimens:  cervical cone biopsy, endocervical curettage    Operative indications:  28 y.o. female with superficially invasive cervical cancer     Operative findings: On exam under anesthesia, the cervix is normal appearing and the cervix and uterus are mobile. After application of Lugol's, no areas of abnormal staining were visualized. Operative reportl:  After the risks, benefits, indications, and alternatives of the procedure were discussed with the patient and informed consent was obtained, the patient was taken to the operating room. She was positively identified, administered general anesthesia, and then placed in the dorsal lithotomy position in [address] [place] [address]. An exam under anesthesia was performed. She was then prepped and draped in the usual fashion. The bladder was drained with a red rubber catheter. A weighted speculum was then placed in the posterior vagina and a hand held-Bar retractor was placed in the anterior vagina in order to visualize the cervix. The anterior lip of the cervix was then grasped with a single tooth tenaculum for manipulation. Lugol's solution was used to identify the margins of the transition zone. A figure-of-eight suture of 0 vicryl was then placed on each side of the cervix to control bleeding and to use for manipulation of the cervix. The single tooth tenaculum was then removed. A #11 blade scalpel was then used to incise the cervix circumferentially to a depth of 1cm.   2-0 silk sutures were then placed for retraction on the stroma of the cervical cone specimen at 12, 3, 6, and 9 o'clock. The 12 o'clock suture was tied down to for orientation purposes. This incision was carried deeper into the cervix the scalpel and then further with the curved Kelly scissors. Once an adequate specimen was achieved, the specimen was transected at the base with the scissors and the specimen was sent for pathology. An endocervical curettage was then performed above the level of the conization. The bed of the conization was then made hemostatic with electrocautery. Three mattress sutures were placed along the posterior lip of the cervix with 0-Vicryl to aid hemostasis. A piece of Surgicel soaked in Monsel's was then placed in the cone bed. The lateral stay sutures were then tied together in the middle to secure the Surgicel in place. All instruments were then removed from the vagina after excellent hemostasis confirmed. The patient was taken out of stirrups, awakened from anesthesia, and taken to the recovery room in stable condition. All instrument, sponge, and needle counts were correct.         Yuko Browning MD  8/12/2022  10:53 AM

## 2022-08-12 NOTE — PERIOP NOTES
Patient: Rowena Otero MRN: [id number]  SSN: [id number]   YOB: 1995  Age: 28 y.o. Sex: female     Patient is status post Procedure(s):  COLD KNIFE CONIZATION.     Surgeon(s) and Role:     * Tuan Roy MD - Primary    Local/Dose/Irrigation:  10 ml 1% lidocaine with epi                  Peripheral IV 08/12/22 Left Hand (Active)                           Dressing/Packing:  Incision 08/12/22 Perineum-Dressing/Treatment: Peripad (08/12/22 1051)    Splint/Cast:  ]    Other:  Trung Perez

## 2022-08-12 NOTE — BRIEF OP NOTE
Brief Postoperative Note    Patient: Graciela Jose  YOB: 1995  MRN: [id number]    Date of Procedure: 8/12/2022     Pre-Op Diagnosis: CERVICAL CANCER    Post-Op Diagnosis: Same as preoperative diagnosis. Procedure(s):  COLD KNIFE CONIZATION    Surgeon(s): Rubén Rowley MD    Surgical Assistant: None    Anesthesia: General     Estimated Blood Loss (mL): Minimal    Complications: None    Specimens:   ID Type Source Tests Collected by Time Destination   1 : Cervical cone stitch at 12:00 Fresh Cervix  Rubén Rowley MD 8/12/2022 1027 Pathology   2 : Endocervical currettings  Fresh Cervix  Rubén Rowley MD 8/12/2022 1032 Pathology        Implants: * No implants in log *    Drains: * No LDAs found *    Findings: On exam under anesthesia, the cervix is normal appearing and the cervix and uterus are mobile. After application of Lugol's, no areas of abnormal staining were visualized.      Electronically Signed by Luisana Larsen MD on 8/12/2022 at 10:52 AM

## 2022-08-12 NOTE — INTERVAL H&P NOTE
Date of Surgery Update:  Guevara Horan was seen and examined. History and physical has been reviewed. The patient has been examined.  There have been no significant clinical changes since the completion of the originally dated History and Physical.    Signed By: Mainor Ovalles MD     August 12, 2022 9:30 AM

## 2022-08-12 NOTE — PROGRESS NOTES
D/C Instructions provided to the pt. Per her request.  She is AOX4. All questions are answered @ this time.

## 2022-08-12 NOTE — DISCHARGE INSTRUCTIONS
[address] [facility] [address], [address], [address]  P [phone]  F [phone]       Rashmi Garcia      Dear Ms. Pyle Lauren,      Please review your instructions with your nurse and ask any questions so you have all the information you need to recover well at home. If you do not feel you have everything you need to succeed and be safe after you leave the hospital, please discuss these concerns with your nurse. As always, call for any questions at home. Your doctor: Chuy Milan MD   Diagnosis: CERVICAL CANCER  Procedure: Procedure(s):  COLD KNIFE CONIZATION  Date of Discharge: 08/12/22       Take Home Medications     See Discharge Medication Review provided to you by your nurse. If you did not receive one, request this prior to your discharge. It is important that you take your medications as they are prescribed. Your prescriptions are sent to your pharmacy on record. Keep your medications in the bottles provided by the pharmacist and keep a list of the medication names, dosages, times to be taken and what they are for in your wallet. Do not take other medications without consulting your doctor. You may take acetaminophen (Tylenol) and ibuprofen (Advil) for pain. If this is not sufficient for your pain, take tramadol or other pain medication you were provided. You should take a daily gentle stool softener such as a colace pill or dulcolax suppository for constipation as this is not uncommon after surgery and/or while on pain medication. If not prescribed, this can be found over the counter. If constipation persists for >24 hours you should take a dose of Milk of Magnesia. Call if your constipation continues. Activity    If possible, have someone with you at all times until you feel stable. Gradually increase your activity each day.  There are generally no restrictions on walking, climbing stairs or riding in a car. Walk at least 4 times per day. Walking will help reduce the risk of blood clots and constipation. Showers are okay. If you have an incision, no tub bathing/swimming for two weeks. No driving for 2 week while on narcotic pain medication. Incision    You should expect some discomfort in the area of your incision, particularly as you increase your activity. If you notice an area of increasing redness or new drainage, please call your doctor. Causes For Concern    If any of the following occur, please call our office and speak with the Nurse/aid who will help you with your problem or ask the doctor to call you. Problems with the incision, including increasing pain, swelling, redness or drainage. Inability to pass urine   Increasing abdominal pain despite medication  Persisting nausea or vomiting. Fever or chills and a temperature >101F  Constipation (no bowel movement for three days). Diarrhea (more than three watery stools within 24 hours). Excessive vaginal or wound bleeding. If after hours and you cannot reach an on-call physician, call 911. Follow-Up    Call [phone] to schedule the following appointments with Dr. Bart Stout:  Telephone virtual-visit in 10-14 days to discuss your pathology results. In-office visit for your postoperative exam in 6 weeks from surgery. Information obtained by :  I understand that if any problems occur once I am at home I am to contact my physician. I understand and acknowledge receipt of the instructions indicated above.                                                                                                                                            Physician's or R.N.'s Signature                                                                  Date/Time Patient or Representative Signature                                                          Date/Time        Adult Sedation (Anesthesia) Discharge Instructions      Discharge Instructions:  Do not consume alcoholic beverages for a minimum of 24 hours  Do not make important personal, legal or business decisions for 24 hours  Move slowly and carefully, do not make sudden position changes. Be alert for dizziness or lightheadedness and move accordingly. Have a responsible person assist you. Do not drive for 24 hours  Do not operate equipment for 24 hours - [employer], power tools, Kitchen accessories: stove, etc.    If you report to an emergency room, doctors office or hospital within 24 hours, [address] and give it to the nurse or physician attending to you.

## 2022-08-12 NOTE — ANESTHESIA PREPROCEDURE EVALUATION
Relevant Problems   ENDOCRINE   (+) Acquired hypothyroidism      PERSONAL HX & FAMILY HX OF CANCER   (+) Malignant neoplasm of exocervix (HCC)       Anesthetic History     PONV          Review of Systems / Medical History  Patient summary reviewed and pertinent labs reviewed    Pulmonary          Smoker      Comments: occasional MJ smoker   Neuro/Psych   Within defined limits        Pertinent negatives: No seizures, TIA and CVA   Cardiovascular                Pertinent negatives: No past MI, angina and CHF  Exercise tolerance: >4 METS     GI/Hepatic/Renal     GERD        Pertinent negatives: No renal disease   Endo/Other      Hypothyroidism    Pertinent negatives: No diabetes   Other Findings              Physical Exam    Airway  Mallampati: III  TM Distance: 4 - 6 cm  Neck ROM: normal range of motion   Mouth opening: Normal     Cardiovascular      Rate: normal         Dental  No notable dental hx       Pulmonary  Breath sounds clear to auscultation               Abdominal  GI exam deferred       Other Findings            Anesthetic Plan    ASA: 2  Anesthesia type: general          Induction: Intravenous  Anesthetic plan and risks discussed with: Patient      GA, LMA

## 2022-08-22 NOTE — PROGRESS NOTES
Virtual Post op Visit to discuss pathology report, surgery was on 8/12/22    1. Have you been to the ER, urgent care clinic since your last visit? Hospitalized since your last visit? Yes, surgery with Dr. Shaheen Alvarenga on 8/12/22    2. Have you seen or consulted any other health care providers outside of the [address] [place] [address] since your last visit? Include any pap smears or colon screening.    no

## 2022-08-23 ENCOUNTER — VIRTUAL VISIT (OUTPATIENT)
Dept: GYNECOLOGY | Age: 27
End: 2022-08-23
Payer: COMMERCIAL

## 2022-08-23 DIAGNOSIS — C53.1 MALIGNANT NEOPLASM OF EXOCERVIX (HCC): Primary | ICD-10-CM

## 2022-08-23 PROCEDURE — 99024 POSTOP FOLLOW-UP VISIT: CPT | Performed by: OBSTETRICS & GYNECOLOGY

## 2022-08-23 NOTE — PROGRESS NOTES
[address] [facility] [address], [address] [facility]  P [phone]  F [phone]    Office Note  Patient ID:  Name:  Madelin Mayer  MRN:  [id number]  :  1995/27 y.o. Date:  2022      HISTORY OF PRESENT ILLNESS:  Ms. Madelin Mayer is a 28 y.o. premenopausal female who presents as a new patient from Dr. 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 LEEP on 2022 secondary to prior HSIL pap smear demonstrated \"superficially invasive squamous cell carcinoma involving transformation zone with high-grade squamous intraepithelial lesion with endocervical glandular extension, depth <1mm\". She was subsequently referred to our office for further discussion and management. The patient has two children; and she would like to maintain her fertility if possible. On 2022 underwent CKC, ECC. Final pathology \"negative for residual dysplasia, malignancy or viral cytopathic effect\". Presents today for pathology review. Doing well without issues. Pathology Review:   2022:  * * *FINAL PATHOLOGIC DIAGNOSIS* * *   1. Cervix, conization:        Cervix with incomplete squamous metaplasia, chronic active cervicitis   and changes consistent with previous LEEP   Negative for residual dysplasia, malignancy or viral cytopathic effect   2. Endocervix, curettings:        One group of atypical cells, favor metaplastic endometrial cells        Fragments of benign endocervix and lower uterine segment   Negative for dysplasia, malignancy or viral cytopathic effect   See comment   * * *Comment* * *   One group of atypical cells is identified in the endocervical curettings. This is favored to be a group of metaplastic or reactive endometrial cells   from the lower uterine segment. Immunohistochemical evaluation is not   possible because the group disappears on deeper levels.      2022:  * * *FINAL PATHOLOGIC DIAGNOSIS* * *   <<<<<  1. Uterus, cervix, excision:        Superficially invasive squamous cell carcinoma involving   transformation zone with high-grade squamous intraepithelial lesion with   endocervical glandular extension   Depth of invasion: <1 mm   Lymph node vascular invasion: None identified   Margins: All margins uninvolved by invasive carcinoma or high-grade   dysplasia   Distance from invasive carcinoma to closest margin: <2 mm to deep and   ectocervical margins   2. Uterus, cervix, submitted as endocervical, excision:        Squamous epithelium negative for dysplasia   Benign endocervical mucosa   >>>>>  * * *Comment* * *   <<<<<  The histologic sections have transformation zone with high-grade dysplasia   with extensive endocervical glandular extension including a single gland   with few nests of superficially invasive tumor cells. The findings have   been discussed with Dr. Jamie Meneses at 900 hours on 6/21/2022. ROS:  A comprehensive review of systems was negative except for that written in the History of Present Illness. , 10 point ROS      ECOG stGstrstastdstest:st st1st Problem List:  Patient Active Problem List    Diagnosis Date Noted    Malignant neoplasm of exocervix ([address] 07/06/2022    Acquired hypothyroidism 03/22/2022    Pregnancy 06/26/2015     PMH:  Past Medical History:   Diagnosis Date    Acquired hypothyroidism 3/22/2022    Anemia     childhood, resolved    GERD (gastroesophageal reflux disease)     Gonorrhea     hx of     PONV (postoperative nausea and vomiting)       PSH:  Past Surgical History:   Procedure Laterality Date    HX CHOLECYSTECTOMY  2020    HX LEEP PROCEDURE  06/16/2022    Dr. Cletus Bumpers LEEP PROCEDURE  2021    HX OTHER SURGICAL  2021    tummy tuck    HX WISDOM TEETH EXTRACTION        Social History:  Social History     Tobacco Use    Smoking status: Never    Smokeless tobacco: Never    Tobacco comments:     social, approx 1 per week   Substance Use Topics    Alcohol use:  Yes Comment: socially       Family History:  Family History   Problem Relation Age of Onset    No Known Problems Mother     No Known Problems Father     Anesth Problems Neg Hx       Medications: (reviewed)  Current Outpatient Medications   Medication Sig    levonorgestrel-ethinyl estradiol (AVIANE, ALESSE, LESSINA) 0.1-20 mg-mcg tab Take 1 Tablet by mouth. FLUoxetine (PROzac) 10 mg tablet Take 10 mg by mouth daily (after lunch). Only takes on week before and week of period    levothyroxine (SYNTHROID) 125 mcg tablet TAKE 1 TABLET BY MOUTH EVERY DAY BEFORE BREAKFAST    acetaminophen (Tylenol Extra Strength) 500 mg tablet Take 2 Tabs by mouth every eight (8) hours as needed for Pain. Indications: pain     No current facility-administered medications for this visit. Allergies: (reviewed)  No Known Allergies       OBJECTIVE:    Physical Exam:  VITAL SIGNS: There were no vitals filed for this visit. There is no height or weight on file to calculate BMI. GENERAL KORI: Conversant, alert, oriented. No acute distress. HEENT: HEENT. No thyroid enlargement. No JVD. Neck: Supple without restrictions. RESPIRATORY: Clear to auscultation and percussion to the bases. No CVAT. CARDIOVASC: RRR without murmur/rub. GASTROINT: soft, non-tender, without masses or organomegaly   MUSCULOSKEL: no joint tenderness, deformity or swelling       EXTREMITIES: extremities normal, atraumatic, no cyanosis or edema   PELVIC: exam chaperoned by nurse. Normal appearing external genitalia. On speculum exam, the vagina and cervix are actually normal appearing. On bimanual the cervix and uterus are normal and mobile. No evidence of masses or nodularity on bimanual exam. Deferred rectovaginal exam.      RECTAL:    ALISHA SURVEY: No suspicious lymphadenopathy or edema noted. NEURO: Grossly intact. No acute deficit.        Lab Date as available:    Lab Results   Component Value Date/Time    WBC 4.8 08/05/2022 12:10 PM    HGB 12.7 08/05/2022 12:10 PM    HCT 39.2 08/05/2022 12:10 PM    PLATELET 107 48/65/9818 12:10 PM    MCV 89.9 08/05/2022 12:10 PM    Hgb, External 10.7 02/18/2015 12:00 AM    Hct, External 32.8 02/18/2015 12:00 AM    Platelet cnt., External 273 02/24/2015 12:00 AM     Lab Results   Component Value Date/Time    Sodium 138 08/05/2022 12:10 PM    Potassium 3.9 08/05/2022 12:10 PM    Chloride 107 08/05/2022 12:10 PM    CO2 25 08/05/2022 12:10 PM    Anion gap 6 08/05/2022 12:10 PM    Glucose 95 08/05/2022 12:10 PM    BUN 10 08/05/2022 12:10 PM    Creatinine 0.81 08/05/2022 12:10 PM    BUN/Creatinine ratio 12 08/05/2022 12:10 PM    GFR est AA >60 08/05/2022 12:10 PM    GFR est non-AA >60 08/05/2022 12:10 PM    Calcium 9.3 08/05/2022 12:10 PM         IMPRESSION/PLAN:    Ms. Jim Castro is a 28 y.o. female with a working diagnosis of superficially invasive SCC of the cervix, depth <1mm. LEEP on 6/16/2022 secondary to prior HSIL pap smear demonstrated \"superficially invasive squamous cell carcinoma involving transformation zone with high-grade squamous intraepithelial lesion with endocervical glandular extension, depth <1mm\". She was subsequently referred to our office for further discussion and management. The patient has two children; and she would like to maintain her fertility if possible. On 8/12/2022 underwent CKC, ECC. Final pathology \"negative for residual dysplasia, malignancy or viral cytopathic effect\". Problems:     Patient Active Problem List    Diagnosis Date Noted    Malignant neoplasm of exocervix ([address] 07/06/2022    Acquired hypothyroidism 03/22/2022    Pregnancy 06/26/2015     Reviewed patient's course to date, including her surgical pathology as per above negative for residual malignancy or dysplasia.  I have discussed that based on her exam and her superficially invasive SCC consistent with Stage 1a SCC of the cervix that a CKC is considered adequate treatment for women who desire to maintain their fertility; and she does desire to maintain her fertility. If she is done with childbearing then the standard of care would be for a hysterectomy. At this time we will continue with observation. RTC in 1 month for postoperative exam, and then will plan for q3 month surveillance and cytology. All questions and concerns were addressed with the patient and she is comfortable with the plan. An electronic signature was used to authenticate this note. Liv Moreno MD    Pursuant to the emergency declaration unde the [address] [place] [address], Critical access hospital waiver authority and the [employer] Act, this Virtual Visit was conducted, with patient's consent, to reduce the patient's risk of exposure to COVID-19 and provide continuity of care for an established patient. Patient identification was verified at the start of the visit: Yes    Services were provided through a video synchronous discussion virtually to substitute for in-person clinic visit. Patient was at her individual home, while the provider was in his/her respective office.     I spent at least 25 minutes with this established patient, and >50% of the time was spent counseling and/or coordinating care regarding pathology discussion and surveillance    Liv Moreno MD

## 2022-08-31 ENCOUNTER — OFFICE VISIT (OUTPATIENT)
Dept: GYNECOLOGY | Age: 27
End: 2022-08-31
Payer: COMMERCIAL

## 2022-08-31 VITALS
HEIGHT: 58 IN | WEIGHT: 113.4 LBS | DIASTOLIC BLOOD PRESSURE: 65 MMHG | BODY MASS INDEX: 23.8 KG/M2 | HEART RATE: 96 BPM | SYSTOLIC BLOOD PRESSURE: 98 MMHG

## 2022-08-31 DIAGNOSIS — C53.1 MALIGNANT NEOPLASM OF EXOCERVIX (HCC): ICD-10-CM

## 2022-08-31 DIAGNOSIS — N89.8 VAGINAL DISCHARGE: Primary | ICD-10-CM

## 2022-08-31 DIAGNOSIS — N89.8 VAGINAL ODOR: ICD-10-CM

## 2022-08-31 PROCEDURE — 99024 POSTOP FOLLOW-UP VISIT: CPT | Performed by: OBSTETRICS & GYNECOLOGY

## 2022-08-31 NOTE — PROGRESS NOTES
[address] [facility] [address], [address] [facility]  P [phone]  F [phone]    Office Note  Patient ID:  Name:  Erlin King  MRN:  [id number]  :  1995/27 y.o. Date:  2022      HISTORY OF PRESENT ILLNESS:  Ms. Erlin King is a 28 y.o. premenopausal female who presents as a new patient from Dr. 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 LEEP on 2022 secondary to prior HSIL pap smear demonstrated \"superficially invasive squamous cell carcinoma involving transformation zone with high-grade squamous intraepithelial lesion with endocervical glandular extension, depth <1mm\". She was subsequently referred to our office for further discussion and management. The patient has two children; and she would like to maintain her fertility if possible. On 2022 underwent CKC, ECC. Final pathology \"negative for residual dysplasia, malignancy or viral cytopathic effect\". Presents today for a problem visit with complaints of vaginal discharge. Denies fevers or chills. Pain resolved. Denies other complaints. Pathology Review:   2022:  * * *FINAL PATHOLOGIC DIAGNOSIS* * *   1. Cervix, conization:        Cervix with incomplete squamous metaplasia, chronic active cervicitis   and changes consistent with previous LEEP   Negative for residual dysplasia, malignancy or viral cytopathic effect   2. Endocervix, curettings:        One group of atypical cells, favor metaplastic endometrial cells        Fragments of benign endocervix and lower uterine segment   Negative for dysplasia, malignancy or viral cytopathic effect   See comment   * * *Comment* * *   One group of atypical cells is identified in the endocervical curettings. This is favored to be a group of metaplastic or reactive endometrial cells   from the lower uterine segment.  Immunohistochemical evaluation is not   possible because the group disappears on deeper levels. 6/16/2022:  * * *FINAL PATHOLOGIC DIAGNOSIS* * *   <<<<<  1. Uterus, cervix, excision:        Superficially invasive squamous cell carcinoma involving   transformation zone with high-grade squamous intraepithelial lesion with   endocervical glandular extension   Depth of invasion: <1 mm   Lymph node vascular invasion: None identified   Margins: All margins uninvolved by invasive carcinoma or high-grade   dysplasia   Distance from invasive carcinoma to closest margin: <2 mm to deep and   ectocervical margins   2. Uterus, cervix, submitted as endocervical, excision:        Squamous epithelium negative for dysplasia   Benign endocervical mucosa   >>>>>  * * *Comment* * *   <<<<<  The histologic sections have transformation zone with high-grade dysplasia   with extensive endocervical glandular extension including a single gland   with few nests of superficially invasive tumor cells. The findings have   been discussed with Dr. Clemente Allen at 900 hours on 6/21/2022. ROS:  A comprehensive review of systems was negative except for that written in the History of Present Illness.  , 10 point ROS      ECOG stGstrstastdstest:st st1st Problem List:  Patient Active Problem List    Diagnosis Date Noted    Malignant neoplasm of exocervix ([address] 07/06/2022    Acquired hypothyroidism 03/22/2022    Pregnancy 06/26/2015     PMH:  Past Medical History:   Diagnosis Date    Acquired hypothyroidism 3/22/2022    Anemia     childhood, resolved    GERD (gastroesophageal reflux disease)     Gonorrhea     hx of     PONV (postoperative nausea and vomiting)       PSH:  Past Surgical History:   Procedure Laterality Date    HX CHOLECYSTECTOMY  2020    HX LEEP PROCEDURE  06/16/2022    Dr. Carlos Sánchez LEEP PROCEDURE  2021    HX OTHER SURGICAL  2021    tummy tuck    HX WISDOM TEETH EXTRACTION        Social History:  Social History     Tobacco Use    Smoking status: Never    Smokeless tobacco: Never    Tobacco comments: social, approx 1 per week   Substance Use Topics    Alcohol use: Yes     Comment: socially       Family History:  Family History   Problem Relation Age of Onset    No Known Problems Mother     No Known Problems Father     Anesth Problems Neg Hx       Medications: (reviewed)  Current Outpatient Medications   Medication Sig    levonorgestrel-ethinyl estradiol (AVIANE, ALESSE, LESSINA) 0.1-20 mg-mcg tab Take 1 Tablet by mouth. FLUoxetine (PROzac) 10 mg tablet Take 10 mg by mouth daily (after lunch). Only takes on week before and week of period    levothyroxine (SYNTHROID) 125 mcg tablet TAKE 1 TABLET BY MOUTH EVERY DAY BEFORE BREAKFAST    acetaminophen (Tylenol Extra Strength) 500 mg tablet Take 2 Tabs by mouth every eight (8) hours as needed for Pain. Indications: pain (Patient not taking: Reported on 8/31/2022)     No current facility-administered medications for this visit. Allergies: (reviewed)  No Known Allergies       OBJECTIVE:    Physical Exam:  VITAL SIGNS: Vitals:    08/31/22 1019   BP: 98/65   Pulse: 96   Weight: 113 lb 6.4 oz (51.4 kg)   Height: 4' 10\" (1.473 m)       Body mass index is 23.7 kg/m². GENERAL KORI: Conversant, alert, oriented. No acute distress. HEENT: HEENT. No thyroid enlargement. No JVD. Neck: Supple without restrictions. RESPIRATORY: Clear to auscultation and percussion to the bases. No CVAT. CARDIOVASC: RRR without murmur/rub. GASTROINT: soft, non-tender, without masses or organomegaly   MUSCULOSKEL: no joint tenderness, deformity or swelling       EXTREMITIES: extremities normal, atraumatic, no cyanosis or edema   PELVIC: exam chaperoned by nurse. Normal appearing external genitalia. On speculum exam, the cervical conization bed is healing well. Some discharge present and culture collected. Vicryl sutures are in place but have pulled through mostly. Bimanual deferred     RECTAL:    ALISHA SURVEY: No suspicious lymphadenopathy or edema noted. NEURO: Grossly intact.  No acute deficit. Lab Date as available:    Lab Results   Component Value Date/Time    WBC 4.8 08/05/2022 12:10 PM    HGB 12.7 08/05/2022 12:10 PM    HCT 39.2 08/05/2022 12:10 PM    PLATELET 863 80/93/0469 12:10 PM    MCV 89.9 08/05/2022 12:10 PM    Hgb, External 10.7 02/18/2015 12:00 AM    Hct, External 32.8 02/18/2015 12:00 AM    Platelet cnt., External 273 02/24/2015 12:00 AM     Lab Results   Component Value Date/Time    Sodium 138 08/05/2022 12:10 PM    Potassium 3.9 08/05/2022 12:10 PM    Chloride 107 08/05/2022 12:10 PM    CO2 25 08/05/2022 12:10 PM    Anion gap 6 08/05/2022 12:10 PM    Glucose 95 08/05/2022 12:10 PM    BUN 10 08/05/2022 12:10 PM    Creatinine 0.81 08/05/2022 12:10 PM    BUN/Creatinine ratio 12 08/05/2022 12:10 PM    GFR est AA >60 08/05/2022 12:10 PM    GFR est non-AA >60 08/05/2022 12:10 PM    Calcium 9.3 08/05/2022 12:10 PM         IMPRESSION/PLAN:    Ms. Jim Castro is a 28 y.o. female with a working diagnosis of superficially invasive SCC of the cervix, depth <1mm. LEEP on 6/16/2022 secondary to prior HSIL pap smear demonstrated \"superficially invasive squamous cell carcinoma involving transformation zone with high-grade squamous intraepithelial lesion with endocervical glandular extension, depth <1mm\". She was subsequently referred to our office for further discussion and management. The patient has two children; and she would like to maintain her fertility if possible. On 8/12/2022 underwent CKC, ECC. Final pathology \"negative for residual dysplasia, malignancy or viral cytopathic effect\". Presents today for a problem visit with complaints of vaginal discharge. Denies fevers or chills. Pain resolved. Denies other complaints. Problems:     Patient Active Problem List    Diagnosis Date Noted    Malignant neoplasm of exocervix ([address] 07/06/2022    Acquired hypothyroidism 03/22/2022    Pregnancy 06/26/2015     Reviewed patient's course to date.  Presents today for a problem visit with complaints of vaginal discharge. Denies fevers or chills. Pain resolved. Denies other complaints. Culture collected. Her CKC bed is healing well. I have discussed that based on her exam and her superficially invasive SCC consistent with Stage 1a SCC of the cervix that a CKC is considered adequate treatment for women who desire to maintain their fertility; and she does desire to maintain her fertility. If she is done with childbearing then the standard of care would be for a hysterectomy. At this time we will continue with observation. RTC in 3 months for continued surveillance and cytology. All questions and concerns were addressed with the patient and she is comfortable with the plan. An electronic signature was used to authenticate this note.      Mary Wells MD

## 2022-08-31 NOTE — PROGRESS NOTES
Post op Visit, surgery was on 8/12/22, CKC, pt report foul smell and yellow/green/brown X 1 week, she states she did have some discharge right after her procedure but then developed a foul odor    1. Have you been to the ER, urgent care clinic since your last visit? Hospitalized since your last visit? Yes, surgery with Dr. Stephanie Lin on 8/12/2022    2. Have you seen or consulted any other health care providers outside of the [address] [place] [address] since your last visit? Include any pap smears or colon screening.    no

## 2022-09-03 DIAGNOSIS — E03.9 ACQUIRED HYPOTHYROIDISM: ICD-10-CM

## 2022-09-03 LAB
A VAGINAE DNA VAG QL NAA+PROBE: NORMAL SCORE
BVAB2 DNA VAG QL NAA+PROBE: NORMAL SCORE
C ALBICANS DNA VAG QL NAA+PROBE: NEGATIVE
C GLABRATA DNA VAG QL NAA+PROBE: NEGATIVE
MEGA1 DNA VAG QL NAA+PROBE: NORMAL SCORE

## 2022-09-06 RX ORDER — LEVOTHYROXINE SODIUM 125 UG/1
TABLET ORAL
Qty: 90 TABLET | Refills: 1 | Status: SHIPPED | OUTPATIENT
Start: 2022-09-06

## 2022-11-16 NOTE — TELEPHONE ENCOUNTER
Patient aware What Type Of Note Output Would You Prefer (Optional)?: Standard Output How Severe Is Your Acne?: severe Is This A New Presentation, Or A Follow-Up?: Follow Up Acne

## 2022-12-31 ENCOUNTER — PATIENT MESSAGE (OUTPATIENT)
Dept: OBGYN CLINIC | Age: 27
End: 2022-12-31

## 2023-01-04 RX ORDER — LEVONORGESTREL AND ETHINYL ESTRADIOL 0.1-0.02MG
1 KIT ORAL DAILY
Qty: 3 DOSE PACK | Refills: 1 | Status: SHIPPED | OUTPATIENT
Start: 2023-01-04

## 2023-01-04 NOTE — TELEPHONE ENCOUNTER
From: Abhinav Blackburn  To: Zhane Kaye MD  Sent: 12/31/2022 9:57 AM EST  Subject: Prescription     Good Morning I need refill for my Birth control, the one who would prescribed it was from [facility] Evert Jarrett but I currently ran out and there are no more refills available.

## 2023-01-13 DIAGNOSIS — E03.9 ACQUIRED HYPOTHYROIDISM: ICD-10-CM

## 2023-01-13 RX ORDER — LEVOTHYROXINE SODIUM 125 UG/1
125 TABLET ORAL
Qty: 90 TABLET | Refills: 1 | Status: SHIPPED | OUTPATIENT
Start: 2023-01-13

## 2023-01-16 NOTE — PROGRESS NOTES
3 month follow up for malignant neoplasm of exocervix,  pt reports no abnormal spotting or bleeding, pt states no questions or concerns for today's visit    1. Have you been to the ER, urgent care clinic since your last visit? Hospitalized since your last visit?  no    2. Have you seen or consulted any other health care providers outside of the [address] [place] [address] since your last visit? Include any pap smears or colon screening.    no

## 2023-01-17 ENCOUNTER — HOSPITAL ENCOUNTER (OUTPATIENT)
Dept: LAB | Age: 28
Discharge: HOME | End: 2023-01-17
Payer: COMMERCIAL

## 2023-01-17 ENCOUNTER — OFFICE VISIT (OUTPATIENT)
Dept: GYNECOLOGY | Age: 28
End: 2023-01-17
Payer: COMMERCIAL

## 2023-01-17 VITALS
BODY MASS INDEX: 24.22 KG/M2 | WEIGHT: 115.4 LBS | HEIGHT: 58 IN | HEART RATE: 83 BPM | DIASTOLIC BLOOD PRESSURE: 74 MMHG | SYSTOLIC BLOOD PRESSURE: 106 MMHG

## 2023-01-17 DIAGNOSIS — C53.1 MALIGNANT NEOPLASM OF EXOCERVIX (HCC): Primary | ICD-10-CM

## 2023-01-17 PROCEDURE — 99214 OFFICE O/P EST MOD 30 MIN: CPT | Performed by: OBSTETRICS & GYNECOLOGY

## 2023-01-17 PROCEDURE — 87624 HPV HI-RISK TYP POOLED RSLT: CPT

## 2023-01-17 PROCEDURE — 88175 CYTOPATH C/V AUTO FLUID REDO: CPT

## 2023-01-17 NOTE — PROGRESS NOTES
[address] [place] [address] [address], [address] [facility] [phone]  F [phone]    Office Note  Patient ID:  Name:  Anurag Rubi  MRN:  [id number]  :  1995/27 y.o. Date:  2023      HISTORY OF PRESENT ILLNESS:  Ms. Anurag Rubi is a 28 y.o. premenopausal female who presents as a new patient from Dr. 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 LEEP on 2022 secondary to prior HSIL pap smear demonstrated \"superficially invasive squamous cell carcinoma involving transformation zone with high-grade squamous intraepithelial lesion with endocervical glandular extension, depth <1mm\". She was subsequently referred to our office for further discussion and management. The patient has two children; and she would like to maintain her fertility if possible. On 2022 underwent CKC, ECC. Final pathology \"negative for residual dysplasia, malignancy or viral cytopathic effect\". Presents today for continued surveillance. Doing well without complaints. Denies vaginal bleeding/discharge, abdominal/pelvic pain, nausea, vomiting, constipation, diarrhea, CP, SOB, hematuria, hematochezia, change in appetite or bowel movements, bloating, fevers, chills, or urinary symptoms. Pathology Review:   2022:  * * *FINAL PATHOLOGIC DIAGNOSIS* * *   1. Cervix, conization:        Cervix with incomplete squamous metaplasia, chronic active cervicitis   and changes consistent with previous LEEP   Negative for residual dysplasia, malignancy or viral cytopathic effect   2. Endocervix, curettings:        One group of atypical cells, favor metaplastic endometrial cells        Fragments of benign endocervix and lower uterine segment   Negative for dysplasia, malignancy or viral cytopathic effect   See comment   * * *Comment* * *   One group of atypical cells is identified in the endocervical curettings.    This is favored to be a group of metaplastic or reactive endometrial cells   from the lower uterine segment. Immunohistochemical evaluation is not   possible because the group disappears on deeper levels. 6/16/2022:  * * *FINAL PATHOLOGIC DIAGNOSIS* * *   <<<<<  1. Uterus, cervix, excision:        Superficially invasive squamous cell carcinoma involving   transformation zone with high-grade squamous intraepithelial lesion with   endocervical glandular extension   Depth of invasion: <1 mm   Lymph node vascular invasion: None identified   Margins: All margins uninvolved by invasive carcinoma or high-grade   dysplasia   Distance from invasive carcinoma to closest margin: <2 mm to deep and   ectocervical margins   2. Uterus, cervix, submitted as endocervical, excision:        Squamous epithelium negative for dysplasia   Benign endocervical mucosa   >>>>>  * * *Comment* * *   <<<<<  The histologic sections have transformation zone with high-grade dysplasia   with extensive endocervical glandular extension including a single gland   with few nests of superficially invasive tumor cells. The findings have   been discussed with Dr. Santi Larry at 900 hours on 6/21/2022. ROS:  A comprehensive review of systems was negative except for that written in the History of Present Illness.  , 10 point ROS      ECOG stGstrstastdstest:st st1st Problem List:  Patient Active Problem List    Diagnosis Date Noted    Malignant neoplasm of exocervix ([address] 07/06/2022    Acquired hypothyroidism 03/22/2022    Pregnancy 06/26/2015     PMH:  Past Medical History:   Diagnosis Date    Acquired hypothyroidism 3/22/2022    Anemia     childhood, resolved    GERD (gastroesophageal reflux disease)     Gonorrhea     hx of     PONV (postoperative nausea and vomiting)       PSH:  Past Surgical History:   Procedure Laterality Date    HX CHOLECYSTECTOMY  2020    HX LEEP PROCEDURE  06/16/2022    Dr. Clinton Mendez LEEP PROCEDURE  2021    HX OTHER SURGICAL  2021    tummy tuck    HX WISDOM TEETH EXTRACTION        Social History:  Social History     Tobacco Use    Smoking status: Never    Smokeless tobacco: Never    Tobacco comments:     social, approx 1 per week   Substance Use Topics    Alcohol use: Yes     Comment: socially       Family History:  Family History   Problem Relation Age of Onset    No Known Problems Mother     No Known Problems Father     Anesth Problems Neg Hx       Medications: (reviewed)  Current Outpatient Medications   Medication Sig    levothyroxine (SYNTHROID) 125 mcg tablet Take 1 Tablet by mouth Daily (before breakfast). levonorgestrel-ethinyl estradiol (AVIANE, ALESSE, LESSINA) 0.1-20 mg-mcg tab Take 1 Tablet by mouth daily. FLUoxetine (PROzac) 10 mg tablet Take 10 mg by mouth daily (after lunch). Only takes on week before and week of period    acetaminophen (Tylenol Extra Strength) 500 mg tablet Take 2 Tabs by mouth every eight (8) hours as needed for Pain. Indications: pain (Patient not taking: No sig reported)     No current facility-administered medications for this visit. Allergies: (reviewed)  No Known Allergies       OBJECTIVE:    Physical Exam:  VITAL SIGNS: Vitals:    01/17/23 1449   BP: 106/74   Pulse: 83   Weight: 115 lb 6.4 oz (52.3 kg)   Height: 4' 10\" (1.473 m)       Body mass index is 24.12 kg/m². GENERAL KORI: Conversant, alert, oriented. No acute distress. HEENT: HEENT. No thyroid enlargement. No JVD. Neck: Supple without restrictions. RESPIRATORY: Clear to auscultation and percussion to the bases. No CVAT. CARDIOVASC: RRR without murmur/rub. GASTROINT: soft, non-tender, without masses or organomegaly   MUSCULOSKEL: no joint tenderness, deformity or swelling       EXTREMITIES: extremities normal, atraumatic, no cyanosis or edema   PELVIC: exam chaperoned by nurse. Normal appearing external genitalia. On speculum exam, the cervical conization bed is healing well. Some discharge present and culture collected.  Vicryl sutures are in place but have pulled through mostly. Bimanual deferred     RECTAL:    ALISHA SURVEY: No suspicious lymphadenopathy or edema noted. NEURO: Grossly intact. No acute deficit. Lab Date as available:    Lab Results   Component Value Date/Time    WBC 4.8 08/05/2022 12:10 PM    HGB 12.7 08/05/2022 12:10 PM    HCT 39.2 08/05/2022 12:10 PM    PLATELET 022 34/73/0301 12:10 PM    MCV 89.9 08/05/2022 12:10 PM    Hgb, External 10.7 02/18/2015 12:00 AM    Hct, External 32.8 02/18/2015 12:00 AM    Platelet cnt., External 273 02/24/2015 12:00 AM     Lab Results   Component Value Date/Time    Sodium 138 08/05/2022 12:10 PM    Potassium 3.9 08/05/2022 12:10 PM    Chloride 107 08/05/2022 12:10 PM    CO2 25 08/05/2022 12:10 PM    Anion gap 6 08/05/2022 12:10 PM    Glucose 95 08/05/2022 12:10 PM    BUN 10 08/05/2022 12:10 PM    Creatinine 0.81 08/05/2022 12:10 PM    BUN/Creatinine ratio 12 08/05/2022 12:10 PM    GFR est AA >60 08/05/2022 12:10 PM    GFR est non-AA >60 08/05/2022 12:10 PM    Calcium 9.3 08/05/2022 12:10 PM         IMPRESSION/PLAN:    Ms. Jericho Irvin is a 28 y.o. female with a working diagnosis of superficially invasive SCC of the cervix, depth <1mm. LEEP on 6/16/2022 secondary to prior HSIL pap smear demonstrated \"superficially invasive squamous cell carcinoma involving transformation zone with high-grade squamous intraepithelial lesion with endocervical glandular extension, depth <1mm\". Negative LVSI. She was subsequently referred to our office for further discussion and management. The patient has two children; and she would like to maintain her fertility if possible. On 8/12/2022 underwent CKC, ECC. Final pathology \"negative for residual dysplasia, malignancy or viral cytopathic effect\".            Problems:     Patient Active Problem List    Diagnosis Date Noted    Malignant neoplasm of exocervix ([address] 07/06/2022    Acquired hypothyroidism 03/22/2022    Pregnancy 06/26/2015     Reviewed patient's course to date. ASHLEY on exam today. Pap collected. Reassured patient. Previously discussed that based on her exam and her superficially invasive SCC consistent with Stage 1a SCC of the cervix that a CKC is considered adequate treatment for women who desire to maintain their fertility; and she does desire to maintain her fertility. If she is done with childbearing then the standard of care would be for a hysterectomy. At this time we will continue with observation. RTC in 3 months for continued surveillance and cytology. All questions and concerns were addressed with the patient and she is comfortable with the plan. An electronic signature was used to authenticate this note.      Damaso Molina MD

## 2023-05-10 ENCOUNTER — OFFICE VISIT (OUTPATIENT)
Age: 28
End: 2023-05-10
Payer: MEDICAID

## 2023-05-10 VITALS
DIASTOLIC BLOOD PRESSURE: 67 MMHG | WEIGHT: 118.8 LBS | HEART RATE: 82 BPM | HEIGHT: 58 IN | BODY MASS INDEX: 24.94 KG/M2 | SYSTOLIC BLOOD PRESSURE: 97 MMHG

## 2023-05-10 DIAGNOSIS — C53.1 MALIGNANT NEOPLASM OF EXOCERVIX (HCC): Primary | ICD-10-CM

## 2023-05-10 PROCEDURE — 99214 OFFICE O/P EST MOD 30 MIN: CPT | Performed by: OBSTETRICS & GYNECOLOGY

## 2023-05-10 ASSESSMENT — PATIENT HEALTH QUESTIONNAIRE - PHQ9
SUM OF ALL RESPONSES TO PHQ QUESTIONS 1-9: 0
SUM OF ALL RESPONSES TO PHQ QUESTIONS 1-9: 0
1. LITTLE INTEREST OR PLEASURE IN DOING THINGS: 0
SUM OF ALL RESPONSES TO PHQ9 QUESTIONS 1 & 2: 0
2. FEELING DOWN, DEPRESSED OR HOPELESS: 0
SUM OF ALL RESPONSES TO PHQ QUESTIONS 1-9: 0
SUM OF ALL RESPONSES TO PHQ QUESTIONS 1-9: 0

## 2023-05-17 LAB
CYTOLOGIST CVX/VAG CYTO: NORMAL
CYTOLOGY CVX/VAG DOC CYTO: NORMAL
CYTOLOGY CVX/VAG DOC THIN PREP: NORMAL
DX ICD CODE: NORMAL
HPV GENOTYPE REFLEX: NORMAL
HPV I/H RISK 4 DNA CVX QL PROBE+SIG AMP: NEGATIVE
Lab: NORMAL
Lab: NORMAL
OTHER STN SPEC: NORMAL
STAT OF ADQ CVX/VAG CYTO-IMP: NORMAL

## 2023-07-12 RX ORDER — TIMOLOL MALEATE 5 MG/ML
SOLUTION/ DROPS OPHTHALMIC
Qty: 84 TABLET | Refills: 1 | OUTPATIENT
Start: 2023-07-12

## 2023-08-08 ENCOUNTER — OFFICE VISIT (OUTPATIENT)
Age: 28
End: 2023-08-08
Payer: MEDICAID

## 2023-08-08 VITALS
HEART RATE: 83 BPM | TEMPERATURE: 98.4 F | SYSTOLIC BLOOD PRESSURE: 117 MMHG | DIASTOLIC BLOOD PRESSURE: 73 MMHG | BODY MASS INDEX: 25.61 KG/M2 | OXYGEN SATURATION: 99 % | HEIGHT: 58 IN | WEIGHT: 122 LBS | RESPIRATION RATE: 14 BRPM

## 2023-08-08 DIAGNOSIS — E03.9 ACQUIRED HYPOTHYROIDISM: ICD-10-CM

## 2023-08-08 DIAGNOSIS — Z00.00 ROUTINE GENERAL MEDICAL EXAMINATION AT A HEALTH CARE FACILITY: Primary | ICD-10-CM

## 2023-08-08 PROCEDURE — 99395 PREV VISIT EST AGE 18-39: CPT | Performed by: FAMILY MEDICINE

## 2023-08-08 RX ORDER — LEVOTHYROXINE SODIUM 0.12 MG/1
125 TABLET ORAL
Qty: 90 TABLET | Refills: 3 | Status: SHIPPED | OUTPATIENT
Start: 2023-08-08

## 2023-08-08 SDOH — ECONOMIC STABILITY: INCOME INSECURITY: HOW HARD IS IT FOR YOU TO PAY FOR THE VERY BASICS LIKE FOOD, HOUSING, MEDICAL CARE, AND HEATING?: NOT HARD AT ALL

## 2023-08-08 SDOH — ECONOMIC STABILITY: FOOD INSECURITY: WITHIN THE PAST 12 MONTHS, THE FOOD YOU BOUGHT JUST DIDN'T LAST AND YOU DIDN'T HAVE MONEY TO GET MORE.: NEVER TRUE

## 2023-08-08 SDOH — ECONOMIC STABILITY: FOOD INSECURITY: WITHIN THE PAST 12 MONTHS, YOU WORRIED THAT YOUR FOOD WOULD RUN OUT BEFORE YOU GOT MONEY TO BUY MORE.: NEVER TRUE

## 2023-08-08 SDOH — ECONOMIC STABILITY: HOUSING INSECURITY
IN THE LAST 12 MONTHS, WAS THERE A TIME WHEN YOU DID NOT HAVE A STEADY PLACE TO SLEEP OR SLEPT IN A SHELTER (INCLUDING NOW)?: NO

## 2023-08-08 ASSESSMENT — PATIENT HEALTH QUESTIONNAIRE - PHQ9
SUM OF ALL RESPONSES TO PHQ QUESTIONS 1-9: 0
2. FEELING DOWN, DEPRESSED OR HOPELESS: 0
1. LITTLE INTEREST OR PLEASURE IN DOING THINGS: 0
SUM OF ALL RESPONSES TO PHQ9 QUESTIONS 1 & 2: 0
SUM OF ALL RESPONSES TO PHQ QUESTIONS 1-9: 0

## 2023-08-08 NOTE — PROGRESS NOTES
Chief Complaint   Patient presents with    Annual Exam     Weight issues    Lab Collection     Fasting today    Thyroid Problem     NO RX x 3 days. Waking up with puffy face     1. Have you been to the ER, urgent care clinic since your last visit? Hospitalized since your last visit? No    2. Have you seen or consulted any other health care providers outside of the [address] [place] [address] Avenue since your last visit? Include any pap smears or colon screening.  Yes Where: Oncology

## 2023-08-09 ENCOUNTER — OFFICE VISIT (OUTPATIENT)
Age: 28
End: 2023-08-09
Payer: MEDICAID

## 2023-08-09 VITALS
DIASTOLIC BLOOD PRESSURE: 75 MMHG | BODY MASS INDEX: 25.73 KG/M2 | HEART RATE: 98 BPM | HEIGHT: 58 IN | WEIGHT: 122.6 LBS | SYSTOLIC BLOOD PRESSURE: 116 MMHG

## 2023-08-09 DIAGNOSIS — R30.0 DYSURIA: ICD-10-CM

## 2023-08-09 DIAGNOSIS — C53.1 MALIGNANT NEOPLASM OF EXOCERVIX (HCC): Primary | ICD-10-CM

## 2023-08-09 LAB
ALBUMIN SERPL-MCNC: 4.5 G/DL (ref 4–5)
ALBUMIN/GLOB SERPL: 1.7 {RATIO} (ref 1.2–2.2)
ALP SERPL-CCNC: 71 IU/L (ref 44–121)
ALT SERPL-CCNC: 14 IU/L (ref 0–32)
AST SERPL-CCNC: 18 IU/L (ref 0–40)
BASOPHILS # BLD AUTO: 0 X10E3/UL (ref 0–0.2)
BASOPHILS NFR BLD AUTO: 0 %
BILIRUB SERPL-MCNC: 1 MG/DL (ref 0–1.2)
BUN SERPL-MCNC: 14 MG/DL (ref 6–20)
BUN/CREAT SERPL: 19 (ref 9–23)
CALCIUM SERPL-MCNC: 9.2 MG/DL (ref 8.7–10.2)
CHLORIDE SERPL-SCNC: 101 MMOL/L (ref 96–106)
CHOLEST SERPL-MCNC: 221 MG/DL (ref 100–199)
CO2 SERPL-SCNC: 22 MMOL/L (ref 20–29)
CREAT SERPL-MCNC: 0.72 MG/DL (ref 0.57–1)
EGFRCR SERPLBLD CKD-EPI 2021: 116 ML/MIN/1.73
EOSINOPHIL # BLD AUTO: 0.2 X10E3/UL (ref 0–0.4)
EOSINOPHIL NFR BLD AUTO: 3 %
ERYTHROCYTE [DISTWIDTH] IN BLOOD BY AUTOMATED COUNT: 12.9 % (ref 11.7–15.4)
GLOBULIN SER CALC-MCNC: 2.6 G/DL (ref 1.5–4.5)
GLUCOSE SERPL-MCNC: 77 MG/DL (ref 70–99)
HBA1C MFR BLD: 5 % (ref 4.8–5.6)
HCT VFR BLD AUTO: 40.4 % (ref 34–46.6)
HDLC SERPL-MCNC: 103 MG/DL
HGB BLD-MCNC: 13.4 G/DL (ref 11.1–15.9)
IMM GRANULOCYTES # BLD AUTO: 0 X10E3/UL (ref 0–0.1)
IMM GRANULOCYTES NFR BLD AUTO: 0 %
IMP & REVIEW OF LAB RESULTS: NORMAL
LDLC SERPL CALC-MCNC: 100 MG/DL (ref 0–99)
LYMPHOCYTES # BLD AUTO: 1.5 X10E3/UL (ref 0.7–3.1)
LYMPHOCYTES NFR BLD AUTO: 25 %
MCH RBC QN AUTO: 30.7 PG (ref 26.6–33)
MCHC RBC AUTO-ENTMCNC: 33.2 G/DL (ref 31.5–35.7)
MCV RBC AUTO: 92 FL (ref 79–97)
MONOCYTES # BLD AUTO: 0.5 X10E3/UL (ref 0.1–0.9)
MONOCYTES NFR BLD AUTO: 8 %
NEUTROPHILS # BLD AUTO: 3.8 X10E3/UL (ref 1.4–7)
NEUTROPHILS NFR BLD AUTO: 64 %
PLATELET # BLD AUTO: 230 X10E3/UL (ref 150–450)
POTASSIUM SERPL-SCNC: 4.2 MMOL/L (ref 3.5–5.2)
PROT SERPL-MCNC: 7.1 G/DL (ref 6–8.5)
RBC # BLD AUTO: 4.37 X10E6/UL (ref 3.77–5.28)
SODIUM SERPL-SCNC: 139 MMOL/L (ref 134–144)
TRIGL SERPL-MCNC: 106 MG/DL (ref 0–149)
TSH SERPL DL<=0.005 MIU/L-ACNC: 3.63 UIU/ML (ref 0.45–4.5)
VLDLC SERPL CALC-MCNC: 18 MG/DL (ref 5–40)
WBC # BLD AUTO: 6 X10E3/UL (ref 3.4–10.8)

## 2023-08-09 PROCEDURE — 99214 OFFICE O/P EST MOD 30 MIN: CPT | Performed by: OBSTETRICS & GYNECOLOGY

## 2023-08-09 RX ORDER — LEVONORGESTREL AND ETHINYL ESTRADIOL 0.1-0.02MG
1 KIT ORAL DAILY
Qty: 3 PACKET | Refills: 3 | Status: SHIPPED | OUTPATIENT
Start: 2023-08-09

## 2023-08-09 RX ORDER — TIMOLOL MALEATE 5 MG/ML
SOLUTION/ DROPS OPHTHALMIC
Qty: 84 TABLET | Refills: 1 | OUTPATIENT
Start: 2023-08-09

## 2023-08-09 NOTE — PROGRESS NOTES
3 month follow up for malignant neoplasm of exocervix ,  pt reports no abnormal spotting or bleeding, pt states no questions or concerns for today's visit, pt requesting refill on her Aviane, OCP    1. Have you been to the ER, urgent care clinic since your last visit? Hospitalized since your last visit?  no    2. Have you seen or consulted any other health care providers outside of the [address] [place] [address] since your last visit? Include any pap smears or colon screening.    no
be a group of metaplastic or reactive endometrial cells   from the lower uterine segment. Immunohistochemical evaluation is not   possible because the group disappears on deeper levels. 6/16/2022:  * * *FINAL PATHOLOGIC DIAGNOSIS* * *   <<<<<  1. Uterus, cervix, excision:        Superficially invasive squamous cell carcinoma involving   transformation zone with high-grade squamous intraepithelial lesion with   endocervical glandular extension   Depth of invasion: <1 mm   Lymph node vascular invasion: None identified   Margins: All margins uninvolved by invasive carcinoma or high-grade   dysplasia   Distance from invasive carcinoma to closest margin: <2 mm to deep and   ectocervical margins   2. Uterus, cervix, submitted as endocervical, excision:        Squamous epithelium negative for dysplasia   Benign endocervical mucosa   >>>>>  * * *Comment* * *   <<<<<  The histologic sections have transformation zone with high-grade dysplasia   with extensive endocervical glandular extension including a single gland   with few nests of superficially invasive tumor cells. The findings have   been discussed with Dr. Tressa Lee at 900 hours on 6/21/2022. ROS:  A comprehensive review of systems was negative except for that written in the History of Present Illness.  , 10 point ROS      ECOG stGstrstastdstest:st st1st Patient Active Problem List    Diagnosis Date Noted    Malignant neoplasm of exocervix ([address]) 07/06/2022    Acquired hypothyroidism 03/22/2022    Pregnancy 06/26/2015       Past Medical History:   Diagnosis Date    Acquired hypothyroidism 3/22/2022    Anemia     childhood, resolved    GERD (gastroesophageal reflux disease)     Gonorrhea     hx of     PONV (postoperative nausea and vomiting)        Past Surgical History:   Procedure Laterality Date    CHOLECYSTECTOMY  2020    LEEP  06/16/2022    Dr. Chaudhari Kamas    LEE  2021    OTHER SURGICAL HISTORY  2021    tummy tuck    WISDOM TOOTH EXTRACTION         Family History   Problem

## 2023-08-10 LAB
APPEARANCE UR: ABNORMAL
BACTERIA #/AREA URNS HPF: ABNORMAL /[HPF]
BILIRUB UR QL STRIP: NEGATIVE
CASTS URNS QL MICRO: ABNORMAL /LPF
COLOR UR: YELLOW
EPI CELLS #/AREA URNS HPF: ABNORMAL /HPF (ref 0–10)
GLUCOSE UR QL STRIP: NEGATIVE
HGB UR QL STRIP: NEGATIVE
KETONES UR QL STRIP: NEGATIVE
LEUKOCYTE ESTERASE UR QL STRIP: NEGATIVE
MICRO URNS: ABNORMAL
MICRO URNS: ABNORMAL
NITRITE UR QL STRIP: NEGATIVE
PH UR STRIP: 8.5 [PH] (ref 5–7.5)
PROT UR QL STRIP: ABNORMAL
RBC #/AREA URNS HPF: ABNORMAL /HPF (ref 0–2)
SP GR UR STRIP: 1.02 (ref 1–1.03)
UROBILINOGEN UR STRIP-MCNC: 0.2 MG/DL (ref 0.2–1)
WBC #/AREA URNS HPF: ABNORMAL /HPF (ref 0–5)

## 2023-08-11 LAB — BACTERIA UR CULT: NORMAL

## 2023-08-12 LAB
CYTOLOGIST CVX/VAG CYTO: NORMAL
CYTOLOGY CVX/VAG DOC CYTO: NORMAL
CYTOLOGY CVX/VAG DOC THIN PREP: NORMAL
DX ICD CODE: NORMAL
HPV GENOTYPE REFLEX: NORMAL
HPV I/H RISK 4 DNA CVX QL PROBE+SIG AMP: NEGATIVE
Lab: NORMAL
OTHER STN SPEC: NORMAL
STAT OF ADQ CVX/VAG CYTO-IMP: NORMAL

## 2023-08-16 ENCOUNTER — TELEPHONE (OUTPATIENT)
Age: 28
End: 2023-08-16

## 2023-08-16 NOTE — TELEPHONE ENCOUNTER
Advised the patient that I will have  review and call her back to confirm results.
Evins Scheuermann is calling about her labs.  If someone could call her and let her know
UTI (urinary tract infection)
UTI (urinary tract infection)

## 2023-09-22 ENCOUNTER — PATIENT MESSAGE (OUTPATIENT)
Age: 28
End: 2023-09-22

## 2023-09-22 DIAGNOSIS — E03.9 ACQUIRED HYPOTHYROIDISM: Primary | ICD-10-CM

## 2023-10-07 NOTE — PROGRESS NOTES
3 month follow up for malignant neoplasm of exocervix ,  pt reports no abnormal spotting or bleeding, pt states no questions or concerns for today's visit    1. Have you been to the ER, urgent care clinic since your last visit? Hospitalized since your last visit?  no    2. Have you seen or consulted any other health care providers outside of the [address] [place] [address] since your last visit? Include any pap smears or colon screening.    no
[address] [place] [address] [address], [address] [facility] [phone]  F [phone]    Office Note  Patient ID:  Name:  Dorothea Moreno  MRN:  [id number]  :  1995/27 y.o. Date:  2023      HISTORY OF PRESENT ILLNESS:  Ms. Dorothea Moreno is a 33 y.o.  premenopausal female who presents as a new patient from Dr. 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 LEEP on 2022 secondary to prior HSIL pap smear demonstrated \"superficially invasive squamous cell carcinoma involving transformation zone with high-grade squamous intraepithelial lesion with endocervical glandular extension, depth <1mm\". She was subsequently referred to our office for further discussion and management. The patient has two children; and she would like to maintain her fertility if possible. On 2022 underwent CKC, ECC. Final pathology \"negative for residual dysplasia, malignancy or viral cytopathic effect\". Presents today for continued surveillance. Doing well without complaints. Denies vaginal bleeding/discharge, abdominal/pelvic pain, nausea, vomiting, constipation, diarrhea, CP, SOB, hematuria, hematochezia, change in appetite or bowel movements, bloating, fevers, chills, or urinary symptoms. Pathology Review:   2022:  * * *FINAL PATHOLOGIC DIAGNOSIS* * *   1. Cervix, conization:        Cervix with incomplete squamous metaplasia, chronic active cervicitis   and changes consistent with previous LEEP   Negative for residual dysplasia, malignancy or viral cytopathic effect   2. Endocervix, curettings:        One group of atypical cells, favor metaplastic endometrial cells        Fragments of benign endocervix and lower uterine segment   Negative for dysplasia, malignancy or viral cytopathic effect   See comment   * * *Comment* * *   One group of atypical cells is identified in the endocervical curettings.    This is favored to
No

## 2023-10-09 NOTE — TELEPHONE ENCOUNTER
From: Joanna Burnett  To: Dr. Micky Castro  Sent: 9/22/2023 11:03 AM EDT  Subject: referral    Good Morning, I recently had my physical exam, and everything looked good. I would however like to know if my cortisol levels were checked, and if not If I can get referred to an endocrinologists. I have many non serious concerns. My weight keeps going up week after week. I am always with a swollen face like I have a lot of fluid retention. I have fatigue, and I have headaches almost everyday. I am going to the gym everyday, eating even more healthier, stopped snacking and running more. Instead my weights keeps going up which is not like me at all, when I exercise and eat healthy I loose weight fast and my face slims this time its not and its been a couple months.

## 2023-10-30 ENCOUNTER — OFFICE VISIT (OUTPATIENT)
Age: 28
End: 2023-10-30
Payer: MEDICAID

## 2023-10-30 VITALS
WEIGHT: 121 LBS | DIASTOLIC BLOOD PRESSURE: 70 MMHG | BODY MASS INDEX: 25.4 KG/M2 | RESPIRATION RATE: 17 BRPM | SYSTOLIC BLOOD PRESSURE: 102 MMHG | HEART RATE: 85 BPM | OXYGEN SATURATION: 98 % | HEIGHT: 58 IN

## 2023-10-30 DIAGNOSIS — R53.83 OTHER FATIGUE: Primary | ICD-10-CM

## 2023-10-30 DIAGNOSIS — E03.9 ACQUIRED HYPOTHYROIDISM: ICD-10-CM

## 2023-10-30 DIAGNOSIS — R63.5 WEIGHT GAIN: ICD-10-CM

## 2023-10-30 DIAGNOSIS — N64.52 BREAST DISCHARGE: ICD-10-CM

## 2023-10-30 PROCEDURE — 99244 OFF/OP CNSLTJ NEW/EST MOD 40: CPT | Performed by: INTERNAL MEDICINE

## 2023-10-30 NOTE — PROGRESS NOTES
Endocrine Consultation    PCP: Wes Casarez MD    Chief Complaint   Patient presents with    New Patient    Thyroid Problem     Hypo     HPI:  Lalitha Couch is a 33 y.o. female with  has a past medical history of Acquired hypothyroidism, Anemia, GERD (gastroesophageal reflux disease), Gonorrhea, and PONV (postoperative nausea and vomiting). who is seen in consultation at the request of Wes Casarez MD for evaluation of thyroid disease.     - pt states she wants to discuss thyroid  - feels sleepy/tired low energy even after 10 hours of sleep  - wakes up with swollen face at time - states d/w Dr. Ricco Kapadia  - over past year, weight fluctuation about 8-10 lbs over past year despite no change in eating habits   - last TSH 3.6 8/823   - on levothyroxine x 4 years, 125 mcg po daily   - Patient has no known history of thyroid surgery, radioactive iodine or ionizing radiation to the head or the neck. No family history of thyroid cancer. There is no family history of thyroid disease. No known history of thyroid nodule or prior FNA of thyroid.    - no HTN/DM   - most worried about fatigue and daily headaches (has informed Dr. Ricco Kapadia re headaches via messaging but not in face-to-face visit)   - takes blackseed oil, no other herbs/supplement   - menses regular, was on ocp per gyn   - plans for future pregnancies   - no terminal hair growth in male pattern   - no dizziness/fainting     Full ROS completed this visit:  As above     LABS/STUDIES:   No results found for: \"[id number]\"    Lab Results   Component Value Date/Time    LABA1C 5.0 08/08/2023 08:38 AM    LABA1C 5.1 03/22/2022 08:40 AM    LABA1C 4.6 02/01/2021 12:00 AM    CREATININE 0.72 08/08/2023 08:38 AM    LABGLOM 116 08/08/2023 08:38 AM    LDLCALC 100 08/08/2023 08:38 AM       Lab Results   Component Value Date/Time    CHOL 221 08/08/2023 08:38 AM    CHOL 196 03/22/2022 08:40 AM    TRIG 106 08/08/2023 08:38 AM     08/08/2023 08:38 AM    LDLCALC 100

## 2023-10-30 NOTE — ASSESSMENT & PLAN NOTE
Emphasized lifestyle changes  Stop sugar sweetened drinks like soda and juice.   Walk 10 000 steps per day or 30 minutes 5 days a week As able  Make sure to choose proteins and mainly for breakfast: eggs, meat, nuts  Intermittent fasting d/w pt as well

## 2023-10-30 NOTE — ASSESSMENT & PLAN NOTE
Dx years ago, euthyroid on last labs. Repeat as w/fatigue and wt gain, adjust levothyroxine if needed. Keep TSH <2.5 as plans for future pregnancy.

## 2023-11-07 ENCOUNTER — OFFICE VISIT (OUTPATIENT)
Age: 28
End: 2023-11-07
Payer: MEDICAID

## 2023-11-07 VITALS
HEART RATE: 97 BPM | BODY MASS INDEX: 25.02 KG/M2 | WEIGHT: 119.2 LBS | SYSTOLIC BLOOD PRESSURE: 101 MMHG | HEIGHT: 58 IN | DIASTOLIC BLOOD PRESSURE: 64 MMHG

## 2023-11-07 DIAGNOSIS — C53.1 MALIGNANT NEOPLASM OF EXOCERVIX (HCC): Primary | ICD-10-CM

## 2023-11-07 PROCEDURE — 99212 OFFICE O/P EST SF 10 MIN: CPT | Performed by: OBSTETRICS & GYNECOLOGY

## 2023-11-07 NOTE — PROGRESS NOTES
3 month follow up for malignant neoplasm of exocervix ,  pt reports no abnormal spotting or bleeding, pt states no questions or concerns for today's visit    1. Have you been to the ER, urgent care clinic since your last visit? Hospitalized since your last visit?  no    2. Have you seen or consulted any other health care providers outside of the [address] [place] [address] since your last visit? Include any pap smears or colon screening.    no
be a group of metaplastic or reactive endometrial cells   from the lower uterine segment. Immunohistochemical evaluation is not   possible because the group disappears on deeper levels. 6/16/2022:  * * *FINAL PATHOLOGIC DIAGNOSIS* * *   <<<<<  1. Uterus, cervix, excision:        Superficially invasive squamous cell carcinoma involving   transformation zone with high-grade squamous intraepithelial lesion with   endocervical glandular extension   Depth of invasion: <1 mm   Lymph node vascular invasion: None identified   Margins: All margins uninvolved by invasive carcinoma or high-grade   dysplasia   Distance from invasive carcinoma to closest margin: <2 mm to deep and   ectocervical margins   2. Uterus, cervix, submitted as endocervical, excision:        Squamous epithelium negative for dysplasia   Benign endocervical mucosa   >>>>>  * * *Comment* * *   <<<<<  The histologic sections have transformation zone with high-grade dysplasia   with extensive endocervical glandular extension including a single gland   with few nests of superficially invasive tumor cells. The findings have   been discussed with Dr. Lili Anguiano at 900 hours on 6/21/2022. ROS:  A comprehensive review of systems was negative except for that written in the History of Present Illness.  , 10 point ROS      ECOG stGstrstastdstest:st st1st Patient Active Problem List    Diagnosis Date Noted    Other fatigue 10/30/2023    Weight gain 10/30/2023    Malignant neoplasm of exocervix ([address]) 07/06/2022    Acquired hypothyroidism 03/22/2022    Pregnancy 06/26/2015       Past Medical History:   Diagnosis Date    Acquired hypothyroidism 3/22/2022    Anemia     childhood, resolved    GERD (gastroesophageal reflux disease)     Gonorrhea     hx of     PONV (postoperative nausea and vomiting)        Past Surgical History:   Procedure Laterality Date    CHOLECYSTECTOMY  2020    LEEP  06/16/2022    Dr. Wilfred Monterroso    LEE  2021    OTHER SURGICAL HISTORY  2021    tummy tuck    WISDOM

## 2023-11-08 ENCOUNTER — HOSPITAL ENCOUNTER (OUTPATIENT)
Facility: HOSPITAL | Age: 28
Setting detail: SPECIMEN
Discharge: HOME | End: 2023-11-11
Payer: MEDICAID

## 2023-11-08 PROCEDURE — 87624 HPV HI-RISK TYP POOLED RSLT: CPT

## 2023-11-08 PROCEDURE — 88175 CYTOPATH C/V AUTO FLUID REDO: CPT

## 2023-11-15 ENCOUNTER — HOSPITAL ENCOUNTER (OUTPATIENT)
Facility: HOSPITAL | Age: 28
Discharge: HOME | End: 2023-11-18
Attending: INTERNAL MEDICINE
Payer: COMMERCIAL

## 2023-11-15 DIAGNOSIS — N64.52 BREAST DISCHARGE: ICD-10-CM

## 2023-11-15 PROCEDURE — 76641 ULTRASOUND BREAST COMPLETE: CPT

## 2024-01-17 ENCOUNTER — TELEMEDICINE (OUTPATIENT)
Age: 29
End: 2024-01-17
Payer: COMMERCIAL

## 2024-01-17 ENCOUNTER — TELEPHONE (OUTPATIENT)
Age: 29
End: 2024-01-17

## 2024-01-17 DIAGNOSIS — F41.9 ANXIETY: Primary | ICD-10-CM

## 2024-01-17 PROCEDURE — 99214 OFFICE O/P EST MOD 30 MIN: CPT | Performed by: FAMILY MEDICINE

## 2024-01-17 RX ORDER — CITALOPRAM HYDROBROMIDE 10 MG/1
10 TABLET ORAL DAILY
Qty: 30 TABLET | Refills: 3 | Status: SHIPPED | OUTPATIENT
Start: 2024-01-17

## 2024-01-17 NOTE — TELEPHONE ENCOUNTER
Called pt in regards to her mychart apt request for medication follow up w/ rancho Olson to call us back.

## 2024-01-17 NOTE — PROGRESS NOTES
Consent: Patricia Jameson, who was seen by synchronous (real-time) audio-video technology, and/or her healthcare decision maker, is aware that this patient-initiated, Telehealth encounter on 1/17/2024 is a billable service, with coverage as determined by her insurance carrier. She is aware that she may receive a bill and has provided verbal consent to proceed: YES-Consent obtained within past 12 months  712    Prior to Admission medications    Medication Sig Start Date End Date Taking? Authorizing Provider   citalopram (CELEXA) 10 MG tablet Take 1 tablet by mouth daily 1/17/24  Yes Mani Olson MD   levonorgestrel-ethinyl estradiol (AVIANE;ALESSE;LESSINA) 0.1-20 MG-MCG per tablet Take 1 tablet by mouth daily  Patient not taking: Reported on 10/30/2023 8/9/23   Moshe Thompson MD   levothyroxine (SYNTHROID) 125 MCG tablet Take 1 tablet by mouth every morning (before breakfast) 8/8/23   Mani Olson MD     No Known Allergies        Assessment & Plan:       ICD-10-CM    1. Anxiety  F41.9 citalopram (CELEXA) 10 MG tablet   Patient here today with complaints of longstanding anxiety.  When she was growing up she was felt that she was a shy girl.  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  She would like to try some medication to level out these feelings.  Will place patient on citalopram as above.  Discussed patient ADRs expectations and outcomes.  Follow-up in 1 month to recheck virtually.       Time was used for level of billing: yes, 30 minutes reviewing previous notes, test results and office visit with the patient discussing the diagnosis and importance of compliance with the treatment plan as well as documenting on the day of the visit.      Medication Side Effects and Warnings were discussed with patient  Patient Labs were reviewed and or requested:  Patient Past Records were reviewed and or

## 2024-01-25 LAB
25(OH)D3+25(OH)D2 SERPL-MCNC: 18.7 NG/ML (ref 30–100)
FERRITIN SERPL-MCNC: 25 NG/ML (ref 15–150)
PROLACTIN SERPL-MCNC: 15.7 NG/ML (ref 4.8–33.4)
T4 FREE SERPL-MCNC: 1.87 NG/DL (ref 0.82–1.77)
TSH SERPL DL<=0.005 MIU/L-ACNC: 1.59 UIU/ML (ref 0.45–4.5)
VIT B12 SERPL-MCNC: 526 PG/ML (ref 232–1245)

## 2024-02-27 ENCOUNTER — TELEMEDICINE (OUTPATIENT)
Age: 29
End: 2024-02-27
Payer: COMMERCIAL

## 2024-02-27 ENCOUNTER — TELEPHONE (OUTPATIENT)
Age: 29
End: 2024-02-27

## 2024-02-27 DIAGNOSIS — R63.5 WEIGHT GAIN: ICD-10-CM

## 2024-02-27 DIAGNOSIS — N64.52 BREAST DISCHARGE: ICD-10-CM

## 2024-02-27 DIAGNOSIS — R53.83 OTHER FATIGUE: Primary | ICD-10-CM

## 2024-02-27 DIAGNOSIS — E03.9 ACQUIRED HYPOTHYROIDISM: ICD-10-CM

## 2024-02-27 PROCEDURE — 99213 OFFICE O/P EST LOW 20 MIN: CPT | Performed by: INTERNAL MEDICINE

## 2024-02-27 NOTE — TELEPHONE ENCOUNTER
Called Pt to complete VV check in, was unable to reach Pt, LVM for Pt to call back to complete VV check in. Amm 2/27/24

## 2024-02-27 NOTE — ASSESSMENT & PLAN NOTE
Dx years ago, euthyroid on last labs on levothyroxine. Keep TSH <2.5 as plans for future pregnancy. Advised generally need 30% increase in levothyroxine dose if found to pregnant and then monthly monitoring.

## 2024-02-27 NOTE — ASSESSMENT & PLAN NOTE
No clinical s/s of adrenal insufficiency   Vit d deficency - start D3 50 mcg daily  B12/ferritin nl   Consider sleep study w/pcp if nika screen +  Life hygiene discussed

## 2024-02-27 NOTE — ASSESSMENT & PLAN NOTE
Re Emphasized lifestyle changes:  Stop sugar sweetened drinks like soda and juice.  Walk 10 000 steps per day or 30 minutes 5 days a week As able  Make sure to choose proteins and mainly for breakfast: eggs, meat, nuts  Intermittent fasting d/w pt as well

## 2024-02-27 NOTE — PROGRESS NOTES
Patricia Jameson is a 28 y.o. female here for   Chief Complaint   Patient presents with    Fatigue       1. Have you been to the ER, urgent care clinic since your last visit?  Hospitalized since your last visit? -no    2. Have you seen or consulted any other health care providers outside of the [facility] since your last visit?  Include any pap smears or colon screening.-no      
regular, was on ocp per gyn   - plans for future pregnancies   - no terminal hair growth in male pattern   - no dizziness/fainting     LABS/STUDIES:   No results found for: \"CHD6STQF\"    Lab Results   Component Value Date/Time    LABA1C 5.0 08/08/2023 08:38 AM    LABA1C 5.1 03/22/2022 08:40 AM    LABA1C 4.6 02/01/2021 12:00 AM    CREATININE 0.72 08/08/2023 08:38 AM    LABGLOM 116 08/08/2023 08:38 AM    LDLCALC 100 08/08/2023 08:38 AM       Lab Results   Component Value Date/Time    CHOL 221 08/08/2023 08:38 AM    CHOL 196 03/22/2022 08:40 AM    TRIG 106 08/08/2023 08:38 AM     08/08/2023 08:38 AM    LDLCALC 100 08/08/2023 08:38 AM       Lab Results   Component Value Date/Time    TSH 1.590 01/24/2024 08:23 AM     No results found for: \"CPEPLT\", \"CPEPTIDE\"    Current Outpatient Medications   Medication Sig Dispense Refill    vitamin D (CHOLECALCIFEROL) 25 MCG (1000 UT) TABS tablet Take 2 tablets by mouth daily 90 tablet 2    citalopram (CELEXA) 10 MG tablet Take 1 tablet by mouth daily 30 tablet 3    levonorgestrel-ethinyl estradiol (AVIANE;ALESSE;LESSINA) 0.1-20 MG-MCG per tablet Take 1 tablet by mouth daily 3 packet 3    levothyroxine (SYNTHROID) 125 MCG tablet Take 1 tablet by mouth every morning (before breakfast) 90 tablet 3     No current facility-administered medications for this visit.        Past Medical History:   Diagnosis Date    Acquired hypothyroidism 3/22/2022    Anemia     childhood, resolved    GERD (gastroesophageal reflux disease)     Gonorrhea     hx of     PONV (postoperative nausea and vomiting)         Past Surgical History:   Procedure Laterality Date    CHOLECYSTECTOMY  2020    CRYSTALP  06/16/2022    Dr. Liza AUGUST  2021    OTHER SURGICAL HISTORY  2021    tummy tuck    WISDOM TOOTH EXTRACTION          Social History     Tobacco Use    Smoking status: Never    Smokeless tobacco: Never   Substance Use Topics    Alcohol use: Yes      Employer:  [employer]  1     Family History   Problem Relation

## 2024-07-03 ENCOUNTER — OFFICE VISIT (OUTPATIENT)
Age: 29
End: 2024-07-03
Payer: COMMERCIAL

## 2024-07-03 ENCOUNTER — HOSPITAL ENCOUNTER (OUTPATIENT)
Facility: HOSPITAL | Age: 29
Setting detail: SPECIMEN
Discharge: HOME | End: 2024-07-06
Payer: COMMERCIAL

## 2024-07-03 VITALS
WEIGHT: 130.6 LBS | BODY MASS INDEX: 27.41 KG/M2 | HEART RATE: 101 BPM | DIASTOLIC BLOOD PRESSURE: 90 MMHG | HEIGHT: 58 IN | SYSTOLIC BLOOD PRESSURE: 127 MMHG

## 2024-07-03 DIAGNOSIS — R30.0 DYSURIA: ICD-10-CM

## 2024-07-03 DIAGNOSIS — C53.1 MALIGNANT NEOPLASM OF EXOCERVIX (HCC): Primary | ICD-10-CM

## 2024-07-03 PROCEDURE — 88175 CYTOPATH C/V AUTO FLUID REDO: CPT

## 2024-07-03 PROCEDURE — 99212 OFFICE O/P EST SF 10 MIN: CPT | Performed by: OBSTETRICS & GYNECOLOGY

## 2024-07-03 PROCEDURE — 87624 HPV HI-RISK TYP POOLED RSLT: CPT

## 2024-07-03 NOTE — PROGRESS NOTES
[facility]  [address] [place] [address], [facility]  [place] [address]  P [phone]  F [phone]    Office Note  Patient ID:  Name:  Patricia Jameson  MRN:  [id number]  :  1995/27 y.o.  Date:  2023      HISTORY OF PRESENT ILLNESS:  Ms. Patricia Jameson is a 29 y.o.  premenopausal female who presents as a new patient from Dr. 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     LEEP on 2022 secondary to prior HSIL pap smear demonstrated \"superficially invasive squamous cell carcinoma involving transformation zone with high-grade squamous intraepithelial lesion with endocervical glandular extension, depth <1mm\". She was subsequently referred to our office for further discussion and management.     The patient has two children; and she would like to maintain her fertility if possible.     On 2022 underwent CKC, ECC. Final pathology \"negative for residual dysplasia, malignancy or viral cytopathic effect\".     Presents today for continued surveillance. Doing well without complaints. Denies vaginal bleeding/discharge, abdominal/pelvic pain, nausea, vomiting, constipation, diarrhea, CP, SOB, hematuria, hematochezia, change in appetite or bowel movements, bloating, fevers, chills, or urinary symptoms.      She is now off birth control and is attempting to get pregnant.      Pathology Review:   2022:  * * *FINAL PATHOLOGIC DIAGNOSIS* * *   1. Cervix, conization:        Cervix with incomplete squamous metaplasia, chronic active cervicitis   and changes consistent with previous LEEP   Negative for residual dysplasia, malignancy or viral cytopathic effect   2. Endocervix, curettings:        One group of atypical cells, favor metaplastic endometrial cells        Fragments of benign endocervix and lower uterine segment   Negative for dysplasia, malignancy or viral cytopathic effect   See comment   * * *Comment* * *   One group of atypical cells

## 2024-07-03 NOTE — PROGRESS NOTES
6 month follow up for malignant neoplasm of exocervix ,  pt reports no abnormal spotting or bleeding, pt states she has left sided lower abdominal pain, pressure with urination, cramping and back pain, pt was given a lab slip to check her urine, she will either go to [facility] or urgent care    1. Have you been to the ER, urgent care clinic since your last visit?  Hospitalized since your last visit?  no    2. Have you seen or consulted any other health care providers outside of the [facility] since your last visit?  Include any pap smears or colon screening.   no

## 2024-07-09 LAB — HPV I/H RISK 1 DNA CVX QL PROBE+SIG AMP: POSITIVE

## 2024-08-08 ENCOUNTER — TELEMEDICINE (OUTPATIENT)
Age: 29
End: 2024-08-08
Payer: COMMERCIAL

## 2024-08-08 DIAGNOSIS — E03.9 ACQUIRED HYPOTHYROIDISM: ICD-10-CM

## 2024-08-08 DIAGNOSIS — F41.9 ANXIETY: Primary | ICD-10-CM

## 2024-08-08 PROCEDURE — 99213 OFFICE O/P EST LOW 20 MIN: CPT | Performed by: FAMILY MEDICINE

## 2024-08-08 RX ORDER — FLUOXETINE 10 MG/1
10 CAPSULE ORAL DAILY
Qty: 30 CAPSULE | Refills: 3 | Status: SHIPPED | OUTPATIENT
Start: 2024-08-08

## 2024-08-08 SDOH — ECONOMIC STABILITY: FOOD INSECURITY: WITHIN THE PAST 12 MONTHS, THE FOOD YOU BOUGHT JUST DIDN'T LAST AND YOU DIDN'T HAVE MONEY TO GET MORE.: NEVER TRUE

## 2024-08-08 SDOH — ECONOMIC STABILITY: FOOD INSECURITY: WITHIN THE PAST 12 MONTHS, YOU WORRIED THAT YOUR FOOD WOULD RUN OUT BEFORE YOU GOT MONEY TO BUY MORE.: NEVER TRUE

## 2024-08-08 SDOH — ECONOMIC STABILITY: INCOME INSECURITY: HOW HARD IS IT FOR YOU TO PAY FOR THE VERY BASICS LIKE FOOD, HOUSING, MEDICAL CARE, AND HEATING?: NOT HARD AT ALL

## 2024-08-08 ASSESSMENT — PATIENT HEALTH QUESTIONNAIRE - PHQ9
SUM OF ALL RESPONSES TO PHQ QUESTIONS 1-9: 0
SUM OF ALL RESPONSES TO PHQ9 QUESTIONS 1 & 2: 0
SUM OF ALL RESPONSES TO PHQ QUESTIONS 1-9: 0
2. FEELING DOWN, DEPRESSED OR HOPELESS: NOT AT ALL
1. LITTLE INTEREST OR PLEASURE IN DOING THINGS: NOT AT ALL
SUM OF ALL RESPONSES TO PHQ QUESTIONS 1-9: 0
SUM OF ALL RESPONSES TO PHQ QUESTIONS 1-9: 0

## 2024-08-08 NOTE — PROGRESS NOTES
Patricia Jameson (:  1995) is a 29 y.o. female, Established patient, here for evaluation of the following chief complaint(s):  No chief complaint on file.         Assessment & Plan  1. Anxiety.  She reported that she started citalopram at the beginning of the year for anxiety and took it for about 2 months. While it helped with her anxiety, she experienced a weight gain of 10 pounds, which led her to discontinue the medication. She currently feels a lack of motivation and has withdrawn from social interactions, which are indicative of depressive symptoms. Fluoxetine has been prescribed as it is weight-neutral and effective for her symptoms. The prescription will be called into [facility]. She is instructed to take it once daily, with the medication expected to start working in about 10 days and reach full effect in a month. If she feels the dose is insufficient after a month, the dosage may be increased.      Results    1. Anxiety  -     FLUoxetine (PROZAC) 10 MG capsule; Take 1 capsule by mouth daily, Disp-30 capsule, R-3Normal  2. Acquired hypothyroidism    No follow-ups on file.       Subjective   History of Present Illness  The patient presents for evaluation of anxiety.    She began taking citalopram for her anxiety at the start of the year. After two months of use, she noticed an improvement in her symptoms. However, she discontinued the medication due to a weight gain of 10 pounds, which she suspects might be a side effect of the drug. Currently, she is experiencing a lack of motivation and has become more withdrawn from social interactions, including with her coworkers. She is seeking an alternative medication that does not cause weight gain.    Review of Systems       Objective   There were no vitals taken for this visit.  Physical Exam         We discussed the expected course, resolution and complications of the diagnosis(es) in detail.  Medication risks, benefits, costs,

## 2024-08-14 DIAGNOSIS — E03.9 ACQUIRED HYPOTHYROIDISM: ICD-10-CM

## 2024-08-14 RX ORDER — LEVOTHYROXINE SODIUM 0.12 MG/1
125 TABLET ORAL
Qty: 90 TABLET | Refills: 3 | Status: SHIPPED | OUTPATIENT
Start: 2024-08-14

## 2024-08-14 RX ORDER — LEVONORGESTREL AND ETHINYL ESTRADIOL 0.1-0.02MG
1 KIT ORAL DAILY
Qty: 84 TABLET | Refills: 3 | Status: SHIPPED | OUTPATIENT
Start: 2024-08-14

## 2024-08-30 DIAGNOSIS — F41.9 ANXIETY: ICD-10-CM

## 2024-09-03 RX ORDER — FLUOXETINE 10 MG/1
CAPSULE ORAL DAILY
Qty: 90 CAPSULE | Refills: 2 | Status: SHIPPED | OUTPATIENT
Start: 2024-09-03

## 2024-09-16 ENCOUNTER — PATIENT MESSAGE (OUTPATIENT)
Age: 29
End: 2024-09-16

## 2024-09-18 RX ORDER — DESVENLAFAXINE 50 MG/1
50 TABLET, FILM COATED, EXTENDED RELEASE ORAL DAILY
Qty: 30 TABLET | Refills: 3 | Status: SHIPPED | OUTPATIENT
Start: 2024-09-18

## 2024-11-13 ENCOUNTER — OFFICE VISIT (OUTPATIENT)
Age: 29
End: 2024-11-13
Payer: COMMERCIAL

## 2024-11-13 VITALS
HEIGHT: 58 IN | HEART RATE: 90 BPM | WEIGHT: 129.8 LBS | BODY MASS INDEX: 27.25 KG/M2 | DIASTOLIC BLOOD PRESSURE: 82 MMHG | TEMPERATURE: 98.7 F | SYSTOLIC BLOOD PRESSURE: 120 MMHG | OXYGEN SATURATION: 98 %

## 2024-11-13 DIAGNOSIS — E03.9 ACQUIRED HYPOTHYROIDISM: ICD-10-CM

## 2024-11-13 DIAGNOSIS — N64.52 BREAST DISCHARGE: ICD-10-CM

## 2024-11-13 DIAGNOSIS — R53.83 OTHER FATIGUE: Primary | ICD-10-CM

## 2024-11-13 DIAGNOSIS — R63.5 WEIGHT GAIN: ICD-10-CM

## 2024-11-13 PROCEDURE — 99214 OFFICE O/P EST MOD 30 MIN: CPT | Performed by: INTERNAL MEDICINE

## 2024-11-13 NOTE — PROGRESS NOTES
PCP: Mani Olson MD    Chief Complaint   Patient presents with    Fatigue     HPI:  Patricia Jameson is a 29 y.o. female with  has a past medical history of Acquired hypothyroidism, Anemia, GERD (gastroesophageal reflux disease), Gonorrhea, and PONV (postoperative nausea and vomiting).here for follow up of thyroid disease.     Today  -reviewed labs+imaging  -no continued breast discharge  -no chest pain, sob, nausea, emesis  -no other clinical change  -taking levothyroxine  Visit notes 10/2023  - pt states she wants to discuss thyroid  - feels sleepy/tired low energy even after 10 hours of sleep  - wakes up with swollen face at time - states d/w Dr. Olson  - over past year, weight fluctuation about 8-10 lbs over past year despite no change in eating habits   - last TSH 3.6 8/823   - on levothyroxine x 4 years, 125 mcg po daily   - Patient has no known history of thyroid surgery, radioactive iodine or ionizing radiation to the head or the neck.   No family history of thyroid cancer.  There is no family history of thyroid disease. No known history of thyroid nodule or prior FNA of thyroid.   - no HTN/DM   - most worried about fatigue and daily headaches (has informed Dr. Olson re headaches via messaging but not in face-to-face visit)   - takes blackseed oil, no other herbs/supplement   - menses regular, was on ocp per gyn   - plans for future pregnancies   - no terminal hair growth in male pattern   - no dizziness/fainting     LABS/STUDIES:   No results found for: \"[id number]\"    Lab Results   Component Value Date/Time    LABA1C 5.0 08/08/2023 08:38 AM    LABA1C 5.1 03/22/2022 08:40 AM    LABA1C 4.6 02/01/2021 12:00 AM    CREATININE 0.72 08/08/2023 08:38 AM    LABGLOM 116 08/08/2023 08:38 AM       Lab Results   Component Value Date/Time    CHOL 221 08/08/2023 08:38 AM    CHOL 196 03/22/2022 08:40 AM    TRIG 106 08/08/2023 08:38 AM     08/08/2023 08:38 AM       Lab Results   Component Value Date/Time    TSH

## 2024-11-13 NOTE — PROGRESS NOTES
Patricia Jameson is a 29 y.o. female here for   Chief Complaint   Patient presents with    Fatigue       1. Have you been to the ER, urgent care clinic since your last visit?  Hospitalized since your last visit? -Pt stated she went to the urgent care for UTI    2. Have you seen or consulted any other health care providers outside of the [facility] since your last visit?  Include any pap smears or colon screening.-NO

## 2024-11-15 ENCOUNTER — TELEPHONE (OUTPATIENT)
Age: 29
End: 2024-11-15

## 2024-11-15 LAB
25(OH)D3+25(OH)D2 SERPL-MCNC: 31.6 NG/ML (ref 30–100)
T4 FREE SERPL-MCNC: 1.62 NG/DL (ref 0.82–1.77)
TSH SERPL DL<=0.005 MIU/L-ACNC: 0.41 UIU/ML (ref 0.45–4.5)

## 2024-11-15 RX ORDER — LEVOTHYROXINE SODIUM 112 UG/1
112 TABLET ORAL DAILY
Qty: 90 TABLET | Refills: 1 | Status: SHIPPED | OUTPATIENT
Start: 2024-11-15

## 2024-11-15 NOTE — TELEPHONE ENCOUNTER
Attempted to call. Unsuccessful. Left msg for Patricia Jameson to give us a call back at the office. A callback number was left.

## 2024-11-15 NOTE — RESULT ENCOUNTER NOTE
Mild overactive thyroid - decrease levothyroxine to 112 mcg daily   New prescription sent   Repeat labs in 2-3 months   Vitamin d normal

## 2024-11-15 NOTE — TELEPHONE ENCOUNTER
----- Message from Dr. Chasity Durán MD sent at 11/15/2024  1:19 PM EST -----  Mild overactive thyroid - decrease levothyroxine to 112 mcg daily   New prescription sent   Repeat labs in 2-3 months   Vitamin d normal

## 2025-02-05 ENCOUNTER — SOCIAL WORK (OUTPATIENT)
Age: 30
End: 2025-02-05

## 2025-02-05 ENCOUNTER — OFFICE VISIT (OUTPATIENT)
Age: 30
End: 2025-02-05
Payer: COMMERCIAL

## 2025-02-05 VITALS
WEIGHT: 136.2 LBS | DIASTOLIC BLOOD PRESSURE: 79 MMHG | HEART RATE: 109 BPM | BODY MASS INDEX: 28.59 KG/M2 | HEIGHT: 58 IN | SYSTOLIC BLOOD PRESSURE: 118 MMHG

## 2025-02-05 DIAGNOSIS — C53.1 MALIGNANT NEOPLASM OF EXOCERVIX (HCC): Primary | ICD-10-CM

## 2025-02-05 PROCEDURE — 99212 OFFICE O/P EST SF 10 MIN: CPT | Performed by: OBSTETRICS & GYNECOLOGY

## 2025-02-05 ASSESSMENT — PATIENT HEALTH QUESTIONNAIRE - PHQ9
SUM OF ALL RESPONSES TO PHQ QUESTIONS 1-9: 0
SUM OF ALL RESPONSES TO PHQ9 QUESTIONS 1 & 2: 0
SUM OF ALL RESPONSES TO PHQ QUESTIONS 1-9: 0
SUM OF ALL RESPONSES TO PHQ QUESTIONS 1-9: 0
1. LITTLE INTEREST OR PLEASURE IN DOING THINGS: NOT AT ALL
SUM OF ALL RESPONSES TO PHQ QUESTIONS 1-9: 0
2. FEELING DOWN, DEPRESSED OR HOPELESS: NOT AT ALL

## 2025-02-05 NOTE — PROGRESS NOTES
6 month follow up for cervical cancer ,  pt reports no abnormal spotting or bleeding, pt states no questions or concerns for today's visit    1. Have you been to the ER, urgent care clinic since your last visit?  Hospitalized since your last visit?  no    2. Have you seen or consulted any other health care providers outside of the [facility] since your last visit?  Include any pap smears or colon screening.   no        
    Patient Active Problem List    Diagnosis Date Noted    Breast discharge 02/27/2024    Other fatigue 10/30/2023    Weight gain 10/30/2023    Malignant neoplasm of exocervix (HCC) 07/06/2022    Acquired hypothyroidism 03/22/2022    Pregnancy 06/26/2015     Reviewed patient's course to date. MARY on exam today. Pap collected. Reassured patient.     Previously discussed that based on her exam and her superficially invasive SCC consistent with Stage 1a SCC of the cervix that a CKC is considered adequate treatment for women who desire to maintain their fertility; and she does desire to maintain her fertility. If she is done with childbearing then the standard of care would be for a hysterectomy. At this time we will continue with observation. Pap last normal 5/10/2023, 8/9/2023, 11/8/2023, 7/3/2024.     RTC in 6 months for continued surveillance and cytology.  As an aside, she is off birth control and attempt to get pregnant.    All questions and concerns were addressed with the patient and she is comfortable with the plan.       An electronic signature was used to authenticate this note.     Moshe Thompson MD

## 2025-02-05 NOTE — PROGRESS NOTES
NCCN Distress Thermometer    Replaced by [facility]    Date Screening Completed: 2/5/25    Screening Declined:  [] Yes    Number that best describes how much distress you've experienced in the past week, including today?  0 [] - No distress 1 [x]      2 []      3 []      4 []       5 []       6 []      7 []      8 []      9 []       10 [] - Extreme distress    PROBLEM LIST  Have you had concerns about any of the items below in the past week, including today?      Physical Concerns Practical Concerns   [] Pain [] Taking care of myself    [] Sleep [] Taking care of others    [] Fatigue [] Safety   [] Tobacco use  [] Work   [] Substance use  [] School   [] Memory or concentration [] Housing/Utilities   [] Sexual health [] Finances   [] Changes in eating  [] Insurance   [] Loss or change of physical abilities  [] Transportation    []    Emotional Concerns [] Having enough food   [] Worry or anxiety [] Access to medicine   [] Sadness or depression [] Treatment decisions   [] Loss of interest or enjoyment     [] Grief or loss  Spiritual or Taoism Concerns   [] Fear [] Sense of meaning or purpose   [] Loneliness  [] Changes in ene or beliefs   [] Anger [] Death, dying, or afterlife   [] Changes in appearance [] Conflict between beliefs and cancer treatments    [] Feelings of worthlessness or being a burden [] Relationship with the sacred    [] Ritual or dietary needs    Social Concerns     [x] Relationship with spouse or partner     [] Relationship with children    [] Relationship with family members     [] Relationship with friends or coworkers     [] Communication with health care team     [] Ability to have children     [] Prejudice or discrimination        Other Concerns: n/a    [facility] Social Work   Psychosocial Assessment      Location: Replaced by [facility]  Patient: Patricia Jameson (1995)    Reason for Assessment: Initial Assessment    [facility]

## 2025-02-07 ENCOUNTER — HOSPITAL ENCOUNTER (OUTPATIENT)
Facility: HOSPITAL | Age: 30
Setting detail: SPECIMEN
Discharge: HOME | End: 2025-02-10
Payer: COMMERCIAL

## 2025-02-07 PROCEDURE — 87625 HPV TYPES 16 & 18 ONLY: CPT

## 2025-02-07 PROCEDURE — 87624 HPV HI-RISK TYP POOLED RSLT: CPT

## 2025-02-07 PROCEDURE — 88175 CYTOPATH C/V AUTO FLUID REDO: CPT

## 2025-02-10 RX ORDER — DESVENLAFAXINE 50 MG/1
50 TABLET, FILM COATED, EXTENDED RELEASE ORAL DAILY
Qty: 30 TABLET | Refills: 3 | Status: SHIPPED | OUTPATIENT
Start: 2025-02-10

## 2025-02-12 LAB — HPV I/H RISK 1 DNA CVX QL PROBE+SIG AMP: POSITIVE

## 2025-02-28 ENCOUNTER — LAB (OUTPATIENT)
Age: 30
End: 2025-02-28

## 2025-02-28 DIAGNOSIS — E03.9 ACQUIRED HYPOTHYROIDISM: ICD-10-CM

## 2025-02-28 LAB
T4 FREE SERPL-MCNC: 1.2 NG/DL (ref 0.8–1.5)
TSH SERPL DL<=0.05 MIU/L-ACNC: 0.92 UIU/ML (ref 0.36–3.74)

## 2025-03-28 ENCOUNTER — HOSPITAL ENCOUNTER (OUTPATIENT)
Facility: HOSPITAL | Age: 30
Setting detail: SPECIMEN
Discharge: HOME | End: 2025-03-31

## 2025-03-28 ENCOUNTER — PROCEDURE VISIT (OUTPATIENT)
Age: 30
End: 2025-03-28
Payer: COMMERCIAL

## 2025-03-28 VITALS
WEIGHT: 137 LBS | BODY MASS INDEX: 28.76 KG/M2 | HEIGHT: 58 IN | SYSTOLIC BLOOD PRESSURE: 105 MMHG | DIASTOLIC BLOOD PRESSURE: 73 MMHG | HEART RATE: 112 BPM

## 2025-03-28 DIAGNOSIS — C53.1 MALIGNANT NEOPLASM OF EXOCERVIX (HCC): Primary | ICD-10-CM

## 2025-03-28 PROCEDURE — 99213 OFFICE O/P EST LOW 20 MIN: CPT | Performed by: OBSTETRICS & GYNECOLOGY

## 2025-03-28 PROCEDURE — 57454 BX/CURETT OF CERVIX W/SCOPE: CPT | Performed by: OBSTETRICS & GYNECOLOGY

## 2025-03-28 ASSESSMENT — PATIENT HEALTH QUESTIONNAIRE - PHQ9
2. FEELING DOWN, DEPRESSED OR HOPELESS: NOT AT ALL
SUM OF ALL RESPONSES TO PHQ QUESTIONS 1-9: 0
SUM OF ALL RESPONSES TO PHQ QUESTIONS 1-9: 0
1. LITTLE INTEREST OR PLEASURE IN DOING THINGS: NOT AT ALL
SUM OF ALL RESPONSES TO PHQ QUESTIONS 1-9: 0
SUM OF ALL RESPONSES TO PHQ QUESTIONS 1-9: 0

## 2025-03-28 NOTE — PROGRESS NOTES
Colposcopy.  
is identified in the endocervical curettings.   This is favored to be a group of metaplastic or reactive endometrial cells   from the lower uterine segment. Immunohistochemical evaluation is not   possible because the group disappears on deeper levels.     6/16/2022:  * * *FINAL PATHOLOGIC DIAGNOSIS* * *   <<<<<  1. Uterus, cervix, excision:        Superficially invasive squamous cell carcinoma involving   transformation zone with high-grade squamous intraepithelial lesion with   endocervical glandular extension   Depth of invasion: <1 mm   Lymph node vascular invasion: None identified   Margins: All margins uninvolved by invasive carcinoma or high-grade   dysplasia   Distance from invasive carcinoma to closest margin: <2 mm to deep and   ectocervical margins   2. Uterus, cervix, submitted as endocervical, excision:        Squamous epithelium negative for dysplasia   Benign endocervical mucosa   >>>>>  * * *Comment* * *   <<<<<  The histologic sections have transformation zone with high-grade dysplasia   with extensive endocervical glandular extension including a single gland   with few nests of superficially invasive tumor cells. The findings have   been discussed with Dr. Adama De Jesus at 900 hours on 6/21/2022.     ROS:  A comprehensive review of systems was negative except for that written in the History of Present Illness. , 10 point ROS      ECOG stGstrstastdstest:st st1st Patient Active Problem List    Diagnosis Date Noted    Breast discharge 02/27/2024    Other fatigue 10/30/2023    Weight gain 10/30/2023    Malignant neoplasm of exocervix (HCC) 07/06/2022    Acquired hypothyroidism 03/22/2022    Pregnancy 06/26/2015       Past Medical History:   Diagnosis Date    Acquired hypothyroidism 3/22/2022    Anemia     childhood, resolved    GERD (gastroesophageal reflux disease)     Gonorrhea     hx of     PONV (postoperative nausea and vomiting)        Past Surgical History:   Procedure Laterality Date    CHOLECYSTECTOMY  2020

## 2025-04-07 ENCOUNTER — RESULTS FOLLOW-UP (OUTPATIENT)
Age: 30
End: 2025-04-07

## 2025-05-27 ENCOUNTER — OFFICE VISIT (OUTPATIENT)
Facility: CLINIC | Age: 30
End: 2025-05-27
Payer: COMMERCIAL

## 2025-05-27 VITALS
RESPIRATION RATE: 18 BRPM | WEIGHT: 141 LBS | HEIGHT: 58 IN | HEART RATE: 88 BPM | TEMPERATURE: 98 F | OXYGEN SATURATION: 98 % | DIASTOLIC BLOOD PRESSURE: 87 MMHG | SYSTOLIC BLOOD PRESSURE: 122 MMHG | BODY MASS INDEX: 29.6 KG/M2

## 2025-05-27 DIAGNOSIS — Z00.00 ROUTINE GENERAL MEDICAL EXAMINATION AT A HEALTH CARE FACILITY: Primary | ICD-10-CM

## 2025-05-27 DIAGNOSIS — E03.9 ACQUIRED HYPOTHYROIDISM: ICD-10-CM

## 2025-05-27 DIAGNOSIS — Z00.00 ROUTINE GENERAL MEDICAL EXAMINATION AT A HEALTH CARE FACILITY: ICD-10-CM

## 2025-05-27 DIAGNOSIS — F41.9 ANXIETY: ICD-10-CM

## 2025-05-27 DIAGNOSIS — R63.5 WEIGHT GAIN: ICD-10-CM

## 2025-05-27 PROCEDURE — 99395 PREV VISIT EST AGE 18-39: CPT | Performed by: FAMILY MEDICINE

## 2025-05-27 RX ORDER — LEVOTHYROXINE SODIUM 112 UG/1
112 TABLET ORAL DAILY
Qty: 90 TABLET | Refills: 1 | Status: SHIPPED | OUTPATIENT
Start: 2025-05-27 | End: 2025-05-29 | Stop reason: ALTCHOICE

## 2025-05-27 SDOH — ECONOMIC STABILITY: FOOD INSECURITY: WITHIN THE PAST 12 MONTHS, THE FOOD YOU BOUGHT JUST DIDN'T LAST AND YOU DIDN'T HAVE MONEY TO GET MORE.: NEVER TRUE

## 2025-05-27 SDOH — ECONOMIC STABILITY: FOOD INSECURITY: WITHIN THE PAST 12 MONTHS, YOU WORRIED THAT YOUR FOOD WOULD RUN OUT BEFORE YOU GOT MONEY TO BUY MORE.: NEVER TRUE

## 2025-05-27 ASSESSMENT — PATIENT HEALTH QUESTIONNAIRE - PHQ9
2. FEELING DOWN, DEPRESSED OR HOPELESS: NOT AT ALL
SUM OF ALL RESPONSES TO PHQ QUESTIONS 1-9: 0
1. LITTLE INTEREST OR PLEASURE IN DOING THINGS: NOT AT ALL
SUM OF ALL RESPONSES TO PHQ QUESTIONS 1-9: 0

## 2025-05-27 NOTE — PROGRESS NOTES
Patient here for yearly follow up. Needs thyroid labs/ med refill.     Have you been to the ER, urgent care clinic since your last visit?  Hospitalized since your last visit?   NO    Have you seen or consulted any other health care providers outside our system since your last visit?   NO                 Patricia Jameson (:  1995) is a 30 y.o. female, here for evaluation of the following chief complaint(s):  Annual Exam (Not fasting)         Assessment & Plan  1. Weight management.  - Weight has been increasing over the past year and a half.  - Comprehensive lab workup will be conducted, including tests for liver function, kidney function, diabetes, cholesterol, thyroid, and A1c.  - Discussion about weight management options and insurance coverage for Wegovy, which is not covered and not indicated based on current weight.  - Counseling provided regarding the use of non-prescription weight loss products and their potential side effects.    2. Depression.  - Currently taking Pristiq.  - Prefers Prozac but experienced increased hunger as a side effect.  - Review of current medication regimen and decision to continue with Pristiq.  - Medication effectiveness and side effects discussed.    3. Thyroid level check.  - Thyroid levels have been stable according to the endocrinologist.  - Thyroid function test will be included in the lab workup to monitor the condition.  - Plan to continue monitoring thyroid levels during routine visits.  - Patient informed that thyroid levels can be checked during family medicine visits.    Results    1. Routine general medical examination at a health care facility  -     Hemoglobin A1C; Future  -     Comprehensive Metabolic Panel; Future  -     CBC with Auto Differential; Future  -     Lipid Panel; Future  -     TSH; Future  2. Acquired hypothyroidism  -     levothyroxine (SYNTHROID) 112 MCG tablet; Take 1 tablet by mouth daily, Disp-90 tablet, R-1Normal  3. Weight gain  4.

## 2025-05-27 NOTE — PROGRESS NOTES
Patient here for yearly follow up. Needs thyroid labs/ med refill.     Have you been to the ER, urgent care clinic since your last visit?  Hospitalized since your last visit?   NO    Have you seen or consulted any other health care providers outside our system since your last visit?   NO

## 2025-05-28 LAB
ALBUMIN SERPL-MCNC: 3.9 G/DL (ref 3.5–5)
ALBUMIN/GLOB SERPL: 1.1 (ref 1.1–2.2)
ALP SERPL-CCNC: 97 U/L (ref 45–117)
ALT SERPL-CCNC: 54 U/L (ref 12–78)
ANION GAP SERPL CALC-SCNC: 5 MMOL/L (ref 2–12)
AST SERPL-CCNC: 27 U/L (ref 15–37)
BASOPHILS # BLD: 0.02 K/UL (ref 0–0.1)
BASOPHILS NFR BLD: 0.3 % (ref 0–1)
BILIRUB SERPL-MCNC: 0.5 MG/DL (ref 0.2–1)
BUN SERPL-MCNC: 10 MG/DL (ref 6–20)
BUN/CREAT SERPL: 15 (ref 12–20)
CALCIUM SERPL-MCNC: 9.7 MG/DL (ref 8.5–10.1)
CHLORIDE SERPL-SCNC: 106 MMOL/L (ref 97–108)
CHOLEST SERPL-MCNC: 212 MG/DL
CO2 SERPL-SCNC: 28 MMOL/L (ref 21–32)
CREAT SERPL-MCNC: 0.66 MG/DL (ref 0.55–1.02)
DIFFERENTIAL METHOD BLD: NORMAL
EOSINOPHIL # BLD: 0.03 K/UL (ref 0–0.4)
EOSINOPHIL NFR BLD: 0.5 % (ref 0–7)
ERYTHROCYTE [DISTWIDTH] IN BLOOD BY AUTOMATED COUNT: 13.1 % (ref 11.5–14.5)
EST. AVERAGE GLUCOSE BLD GHB EST-MCNC: 88 MG/DL
GLOBULIN SER CALC-MCNC: 3.7 G/DL (ref 2–4)
GLUCOSE SERPL-MCNC: 95 MG/DL (ref 65–100)
HBA1C MFR BLD: 4.7 % (ref 4–5.6)
HCT VFR BLD AUTO: 40 % (ref 35–47)
HDLC SERPL-MCNC: 82 MG/DL
HDLC SERPL: 2.6 (ref 0–5)
HGB BLD-MCNC: 13.1 G/DL (ref 11.5–16)
IMM GRANULOCYTES # BLD AUTO: 0.01 K/UL (ref 0–0.04)
IMM GRANULOCYTES NFR BLD AUTO: 0.2 % (ref 0–0.5)
LDLC SERPL CALC-MCNC: 103 MG/DL (ref 0–100)
LYMPHOCYTES # BLD: 1.45 K/UL (ref 0.8–3.5)
LYMPHOCYTES NFR BLD: 24.3 % (ref 12–49)
MCH RBC QN AUTO: 31 PG (ref 26–34)
MCHC RBC AUTO-ENTMCNC: 32.8 G/DL (ref 30–36.5)
MCV RBC AUTO: 94.6 FL (ref 80–99)
MONOCYTES # BLD: 0.48 K/UL (ref 0–1)
MONOCYTES NFR BLD: 8 % (ref 5–13)
NEUTS SEG # BLD: 3.98 K/UL (ref 1.8–8)
NEUTS SEG NFR BLD: 66.7 % (ref 32–75)
NRBC # BLD: 0 K/UL (ref 0–0.01)
NRBC BLD-RTO: 0 PER 100 WBC
PLATELET # BLD AUTO: 269 K/UL (ref 150–400)
PMV BLD AUTO: 11.2 FL (ref 8.9–12.9)
POTASSIUM SERPL-SCNC: 4 MMOL/L (ref 3.5–5.1)
PROT SERPL-MCNC: 7.6 G/DL (ref 6.4–8.2)
RBC # BLD AUTO: 4.23 M/UL (ref 3.8–5.2)
SODIUM SERPL-SCNC: 139 MMOL/L (ref 136–145)
TRIGL SERPL-MCNC: 135 MG/DL
TSH SERPL DL<=0.05 MIU/L-ACNC: 4.35 UIU/ML (ref 0.36–3.74)
VLDLC SERPL CALC-MCNC: 27 MG/DL
WBC # BLD AUTO: 6 K/UL (ref 3.6–11)

## 2025-05-29 ENCOUNTER — RESULTS FOLLOW-UP (OUTPATIENT)
Facility: CLINIC | Age: 30
End: 2025-05-29

## 2025-05-29 RX ORDER — LEVOTHYROXINE SODIUM 137 UG/1
137 TABLET ORAL DAILY
Qty: 90 TABLET | Refills: 1 | Status: SHIPPED | OUTPATIENT
Start: 2025-05-29

## 2025-06-10 ENCOUNTER — PATIENT MESSAGE (OUTPATIENT)
Facility: CLINIC | Age: 30
End: 2025-06-10

## 2025-06-10 RX ORDER — DESVENLAFAXINE 50 MG/1
50 TABLET, FILM COATED, EXTENDED RELEASE ORAL DAILY
Qty: 90 TABLET | Refills: 2 | Status: SHIPPED | OUTPATIENT
Start: 2025-06-10

## 2025-07-07 ENCOUNTER — OFFICE VISIT (OUTPATIENT)
Age: 30
End: 2025-07-07
Payer: COMMERCIAL

## 2025-07-07 VITALS
HEART RATE: 89 BPM | SYSTOLIC BLOOD PRESSURE: 100 MMHG | DIASTOLIC BLOOD PRESSURE: 68 MMHG | BODY MASS INDEX: 27.94 KG/M2 | HEIGHT: 59 IN | WEIGHT: 138.6 LBS

## 2025-07-07 DIAGNOSIS — Z01.419 ENCOUNTER FOR GYNECOLOGICAL EXAMINATION (GENERAL) (ROUTINE) WITHOUT ABNORMAL FINDINGS: Primary | ICD-10-CM

## 2025-07-07 DIAGNOSIS — N89.8 VAGINAL DISCHARGE: ICD-10-CM

## 2025-07-07 PROCEDURE — 99459 PELVIC EXAMINATION: CPT | Performed by: OBSTETRICS & GYNECOLOGY

## 2025-07-07 PROCEDURE — 99395 PREV VISIT EST AGE 18-39: CPT | Performed by: OBSTETRICS & GYNECOLOGY

## 2025-07-07 RX ORDER — HYDROXYZINE HYDROCHLORIDE 25 MG/1
TABLET, FILM COATED ORAL
COMMUNITY
Start: 2025-06-17

## 2025-07-07 RX ORDER — BUPROPION HYDROCHLORIDE 75 MG/1
TABLET ORAL
COMMUNITY
Start: 2025-06-17

## 2025-07-07 RX ORDER — LEVONORGESTREL/ETHIN.ESTRADIOL 0.1-0.02MG
1 TABLET ORAL DAILY
Qty: 84 TABLET | Refills: 4 | Status: SHIPPED | OUTPATIENT
Start: 2025-07-07

## 2025-07-07 NOTE — PROGRESS NOTES
Patricia Jameson is a 30 y.o. female returns for an annual exam     Chief Complaint   Patient presents with    New Patient    Annual Exam       Patient's last menstrual period was 06/28/2025 (approximate).  Her periods are moderate in flow and usually regular with a 26-32 day interval with 3-7 day duration.  She does not have dysmenorrhea.  Problems: discharge  Birth Control: OCP (estrogen/progesterone).  Last Pap: Normal,HPV+ obtained 2/7/25  She does not have a history of RENO 2, 3 or cervical cancer.   With regard to the Gardisil vaccine, she declines to receive it      1. Have you been to the ER, urgent care clinic, or hospitalized since your last visit? No    2. Have you seen or consulted any other health care providers outside of the [facility] since your last visit? No    Examination chaperoned by Alexandra Leslie LPN.  
identified    Genitourinary  External Genitalia: normal appearance for age, no discharge present, no tenderness present, no inflammatory lesions present, no masses present, no atrophy present  Vagina: normal vaginal vault without central or paravaginal defects, white discharge present, no inflammatory lesions present, no masses present  Bladder: non-tender to palpation  Urethra: appears normal  Cervix: normal   Uterus: normal size, shape and consistency  Adnexa: no adnexal tenderness present, no adnexal masses present  Perineum: perineum within normal limits, no evidence of trauma, no rashes or skin lesions present  Anus: anus within normal limits, no hemorrhoids present  Inguinal Lymph Nodes: no lymphadenopathy present    Skin  General Inspection: no rash, no lesions identified    Neurologic/Psychiatric  Mental Status:  Orientation: grossly oriented to person, place and time  Mood and Affect: mood normal, affect appropriate    .  Assessment:  Routine gynecologic examination  Her current medical status is satisfactory with no evidence of significant gynecologic issues.  Vaginal discharge  Hx of microinvasive cervical cancer - s/p CKC Jay  Plan:  Counseled re: diet, exercise, healthy lifestyle  Return for yearly wellness visits  Nuswab, GCC

## 2025-07-09 LAB
A VAGINAE DNA VAG QL NAA+PROBE: NORMAL SCORE
BVAB2 DNA VAG QL NAA+PROBE: NORMAL SCORE
C ALBICANS DNA VAG QL NAA+PROBE: NEGATIVE
C GLABRATA DNA VAG QL NAA+PROBE: NEGATIVE
C TRACH DNA SPEC QL NAA+PROBE: NEGATIVE
MEGA1 DNA VAG QL NAA+PROBE: NORMAL SCORE
N GONORRHOEA DNA VAG QL NAA+PROBE: NEGATIVE
T VAGINALIS DNA VAG QL NAA+PROBE: NEGATIVE

## (undated) DEVICE — DRAPE,LITHOTOMY,STERILE: Brand: MEDLINE

## (undated) DEVICE — SURGICAL PROCEDURE KIT GEN LAPAROSCOPY LF

## (undated) DEVICE — PAD,NON-ADHERENT,3X8,STERILE,LF,1/PK: Brand: MEDLINE

## (undated) DEVICE — ELECTRODE ES L11CM DIA5MM BALL SHFT RED DISP UTAHLOOP

## (undated) DEVICE — CLICKLINE SCISSORS INSERT: Brand: CLICKLINE

## (undated) DEVICE — NEEDLE SPNL 22GA L3.5IN BLK HUB S STL REG WALL FIT STYL W/

## (undated) DEVICE — SCALPEL SURG SZ 11 POLYMER COAT SFTY DISPOSABLE

## (undated) DEVICE — APPLIER CLP M L L11.4IN DIA10MM ENDOSCP ROT MULT FOR LIG

## (undated) DEVICE — COVER LT HNDL PLAS RIG 1 PER PK

## (undated) DEVICE — SOLUTION IRRIG 1000ML 0.9% SOD CHL USP POUR PLAS BTL

## (undated) DEVICE — SYR 10ML CTRL LR LCK NSAF LF --

## (undated) DEVICE — DRAPE,REIN 53X77,STERILE: Brand: MEDLINE

## (undated) DEVICE — TISSUE RETRIEVAL SYSTEM: Brand: INZII RETRIEVAL SYSTEM

## (undated) DEVICE — SHEET,DRAPE,UNDERBUTTOCK,GRAD POUCH,PORT: Brand: MEDLINE

## (undated) DEVICE — LLETZ LOOP ELECTRODE, 20MM WIDE X 15MM DEEP, 11.8 CM SHAFT, BLUE: Brand: MEGADYNE

## (undated) DEVICE — SOL IRRIGATION INJ NACL 0.9% 500ML BTL

## (undated) DEVICE — SWAB MEDICATED 8 IN PROCTO

## (undated) DEVICE — GLOVE ORANGE PI 7   MSG9070

## (undated) DEVICE — GARMENT,MEDLINE,DVT,INT,CALF,MED, GEN2: Brand: MEDLINE

## (undated) DEVICE — STRAP,POSITIONING,KNEE/BODY,FOAM,4X60": Brand: MEDLINE

## (undated) DEVICE — LEGGINGS, PAIR, 31X48, STERILE: Brand: MEDLINE

## (undated) DEVICE — PREP SKN CHLRAPRP APL 26ML STR --

## (undated) DEVICE — SPONGE HEMOSTAT CELLULS 4X8IN -- SURGICEL

## (undated) DEVICE — PAD,SANITARY,11 IN,MAXI,N-STRL,IND WRAP: Brand: MEDLINE

## (undated) DEVICE — LLETZ LOOP ELECTRODE, 20MM WIDE X 10MM DEEP, 11.8 CM SHAFT, RED: Brand: MEGADYNE

## (undated) DEVICE — STERILE POLYISOPRENE POWDER-FREE SURGICAL GLOVES: Brand: PROTEXIS

## (undated) DEVICE — REM POLYHESIVE ADULT PATIENT RETURN ELECTRODE: Brand: VALLEYLAB

## (undated) DEVICE — COVER,TABLE,60X90,STERILE: Brand: MEDLINE

## (undated) DEVICE — LAPAROSCOPIC TROCAR SLEEVE/SINGLE USE: Brand: KII® OPTICAL ACCESS SYSTEM

## (undated) DEVICE — NEEDLE HYPO 22GA L1.5IN BLK S STL HUB POLYPR SHLD REG BVL

## (undated) DEVICE — SUTURE SZ 0 27IN 5/8 CIR UR-6  TAPER PT VIOLET ABSRB VICRYL J603H

## (undated) DEVICE — DEVICE TRNSF SPIK STL 2008S] MICROTEK MEDICAL INC]

## (undated) DEVICE — PREMIUM WET SKIN PREP TRAY: Brand: MEDLINE INDUSTRIES, INC.

## (undated) DEVICE — PENCIL SMK EVAC 10 FT BLADE ELECTRD ROCKER FOR TELSCP

## (undated) DEVICE — LLETZ LOOP ELECTRODE, 15MM WIDE X 12MM DEEP, 11.8 CM SHAFT, GREEN: Brand: MEGADYNE

## (undated) DEVICE — SUTURE MCRYL SZ 4-0 L27IN ABSRB UD L19MM PS-2 1/2 CIR PRIM Y426H

## (undated) DEVICE — INFECTION CONTROL KIT SYS

## (undated) DEVICE — ROCKER SWITCH PENCIL BLADE ELECTRODE, HOLSTER: Brand: EDGE

## (undated) DEVICE — TUBING, SUCTION, 1/4" X 10', STRAIGHT: Brand: MEDLINE

## (undated) DEVICE — GOWN,SIRUS,NONRNF,SETINSLV,XL,20/CS: Brand: MEDLINE

## (undated) DEVICE — CATHETER,URETHRAL,REDRUBBER,STRL,16FR: Brand: MEDLINE

## (undated) DEVICE — SUTURE VCRL SZ 0 L36IN ABSRB VLT L36MM CT-1 1/2 CIR J346H

## (undated) DEVICE — TROCAR: Brand: KII® OPTICAL ACCESS SYSTEM

## (undated) DEVICE — SUTURE VCRL SZ 0 L36IN ABSRB UD L36MM CT-1 1/2 CIR J946H

## (undated) DEVICE — BASIN ST MAJOR-NO CAUTERY: Brand: MEDLINE INDUSTRIES, INC.

## (undated) DEVICE — PERI GYN-SFMC: Brand: MEDLINE INDUSTRIES, INC.

## (undated) DEVICE — TROCAR: Brand: KII® SLEEVE